# Patient Record
Sex: FEMALE | Race: WHITE | Employment: UNEMPLOYED | URBAN - METROPOLITAN AREA
[De-identification: names, ages, dates, MRNs, and addresses within clinical notes are randomized per-mention and may not be internally consistent; named-entity substitution may affect disease eponyms.]

---

## 2021-03-19 ENCOUNTER — IMMUNIZATION (OUTPATIENT)
Dept: PRIMARY CARE CLINIC | Age: 41
End: 2021-03-19
Payer: COMMERCIAL

## 2021-03-19 PROCEDURE — 91300 COVID-19, PFIZER VACCINE 30MCG/0.3ML DOSE: CPT | Performed by: FAMILY MEDICINE

## 2021-03-19 PROCEDURE — 0001A COVID-19, PFIZER VACCINE 30MCG/0.3ML DOSE: CPT | Performed by: FAMILY MEDICINE

## 2021-04-09 ENCOUNTER — IMMUNIZATION (OUTPATIENT)
Dept: PRIMARY CARE CLINIC | Age: 41
End: 2021-04-09
Payer: COMMERCIAL

## 2021-04-09 PROCEDURE — 0002A COVID-19, PFIZER VACCINE 30MCG/0.3ML DOSE: CPT | Performed by: FAMILY MEDICINE

## 2021-04-09 PROCEDURE — 91300 COVID-19, PFIZER VACCINE 30MCG/0.3ML DOSE: CPT | Performed by: FAMILY MEDICINE

## 2021-05-08 ENCOUNTER — APPOINTMENT (OUTPATIENT)
Dept: CT IMAGING | Age: 41
End: 2021-05-08
Payer: COMMERCIAL

## 2021-05-08 ENCOUNTER — HOSPITAL ENCOUNTER (EMERGENCY)
Age: 41
Discharge: HOME | End: 2021-05-08
Payer: COMMERCIAL

## 2021-05-08 VITALS
BODY MASS INDEX: 34.13 KG/M2 | RESPIRATION RATE: 14 BRPM | SYSTOLIC BLOOD PRESSURE: 105 MMHG | TEMPERATURE: 98.1 F | OXYGEN SATURATION: 100 % | WEIGHT: 252 LBS | HEART RATE: 64 BPM | DIASTOLIC BLOOD PRESSURE: 64 MMHG | HEIGHT: 72 IN

## 2021-05-08 DIAGNOSIS — M43.00 SPONDYLOLYSIS: ICD-10-CM

## 2021-05-08 DIAGNOSIS — G89.29 ACUTE EXACERBATION OF CHRONIC LOW BACK PAIN: Primary | ICD-10-CM

## 2021-05-08 DIAGNOSIS — M54.50 ACUTE EXACERBATION OF CHRONIC LOW BACK PAIN: Primary | ICD-10-CM

## 2021-05-08 LAB
A/G RATIO: 1.8 (ref 1.1–2.2)
ALBUMIN SERPL-MCNC: 4.5 G/DL (ref 3.4–5)
ALP BLD-CCNC: 56 U/L (ref 40–129)
ALT SERPL-CCNC: 15 U/L (ref 10–40)
ANION GAP SERPL CALCULATED.3IONS-SCNC: 8 MMOL/L (ref 3–16)
AST SERPL-CCNC: 13 U/L (ref 15–37)
BASOPHILS ABSOLUTE: 0 K/UL (ref 0–0.2)
BASOPHILS RELATIVE PERCENT: 0.5 %
BILIRUB SERPL-MCNC: 0.5 MG/DL (ref 0–1)
BILIRUBIN URINE: NEGATIVE
BLOOD, URINE: NEGATIVE
BUN BLDV-MCNC: 8 MG/DL (ref 7–20)
CALCIUM SERPL-MCNC: 9.9 MG/DL (ref 8.3–10.6)
CHLORIDE BLD-SCNC: 103 MMOL/L (ref 99–110)
CLARITY: CLEAR
CO2: 27 MMOL/L (ref 21–32)
COLOR: YELLOW
CREAT SERPL-MCNC: 0.7 MG/DL (ref 0.6–1.1)
EOSINOPHILS ABSOLUTE: 0 K/UL (ref 0–0.6)
EOSINOPHILS RELATIVE PERCENT: 0.9 %
GFR AFRICAN AMERICAN: >60
GFR NON-AFRICAN AMERICAN: >60
GLOBULIN: 2.5 G/DL
GLUCOSE BLD-MCNC: 88 MG/DL (ref 70–99)
GLUCOSE URINE: NEGATIVE MG/DL
HCT VFR BLD CALC: 39.6 % (ref 36–48)
HEMOGLOBIN: 13.3 G/DL (ref 12–16)
KETONES, URINE: NEGATIVE MG/DL
LEUKOCYTE ESTERASE, URINE: NEGATIVE
LYMPHOCYTES ABSOLUTE: 1.5 K/UL (ref 1–5.1)
LYMPHOCYTES RELATIVE PERCENT: 33.9 %
MCH RBC QN AUTO: 29.8 PG (ref 26–34)
MCHC RBC AUTO-ENTMCNC: 33.7 G/DL (ref 31–36)
MCV RBC AUTO: 88.5 FL (ref 80–100)
MICROSCOPIC EXAMINATION: NORMAL
MONOCYTES ABSOLUTE: 0.4 K/UL (ref 0–1.3)
MONOCYTES RELATIVE PERCENT: 9.1 %
NEUTROPHILS ABSOLUTE: 2.4 K/UL (ref 1.7–7.7)
NEUTROPHILS RELATIVE PERCENT: 55.6 %
NITRITE, URINE: NEGATIVE
PDW BLD-RTO: 13.8 % (ref 12.4–15.4)
PH UA: 6 (ref 5–8)
PLATELET # BLD: 215 K/UL (ref 135–450)
PMV BLD AUTO: 7.7 FL (ref 5–10.5)
POTASSIUM SERPL-SCNC: 4.1 MMOL/L (ref 3.5–5.1)
PROTEIN UA: NEGATIVE MG/DL
RBC # BLD: 4.48 M/UL (ref 4–5.2)
SODIUM BLD-SCNC: 138 MMOL/L (ref 136–145)
SPECIFIC GRAVITY UA: <=1.005 (ref 1–1.03)
TOTAL PROTEIN: 7 G/DL (ref 6.4–8.2)
URINE TYPE: NORMAL
UROBILINOGEN, URINE: 0.2 E.U./DL
WBC # BLD: 4.3 K/UL (ref 4–11)

## 2021-05-08 PROCEDURE — 99285 EMERGENCY DEPT VISIT HI MDM: CPT

## 2021-05-08 PROCEDURE — 6360000002 HC RX W HCPCS: Performed by: NURSE PRACTITIONER

## 2021-05-08 PROCEDURE — 96372 THER/PROPH/DIAG INJ SC/IM: CPT

## 2021-05-08 PROCEDURE — 85025 COMPLETE CBC W/AUTO DIFF WBC: CPT

## 2021-05-08 PROCEDURE — 81003 URINALYSIS AUTO W/O SCOPE: CPT

## 2021-05-08 PROCEDURE — 6370000000 HC RX 637 (ALT 250 FOR IP): Performed by: NURSE PRACTITIONER

## 2021-05-08 PROCEDURE — 80053 COMPREHEN METABOLIC PANEL: CPT

## 2021-05-08 PROCEDURE — 72131 CT LUMBAR SPINE W/O DYE: CPT

## 2021-05-08 RX ORDER — ORPHENADRINE CITRATE 30 MG/ML
60 INJECTION INTRAMUSCULAR; INTRAVENOUS ONCE
Status: COMPLETED | OUTPATIENT
Start: 2021-05-08 | End: 2021-05-08

## 2021-05-08 RX ORDER — CYCLOBENZAPRINE HCL 10 MG
10 TABLET ORAL 3 TIMES DAILY PRN
Qty: 21 TABLET | Refills: 0 | Status: SHIPPED | OUTPATIENT
Start: 2021-05-08 | End: 2021-05-18

## 2021-05-08 RX ORDER — PREDNISONE 20 MG/1
60 TABLET ORAL ONCE
Status: COMPLETED | OUTPATIENT
Start: 2021-05-08 | End: 2021-05-08

## 2021-05-08 RX ORDER — IBUPROFEN 600 MG/1
600 TABLET ORAL 3 TIMES DAILY PRN
Qty: 30 TABLET | Refills: 0 | Status: SHIPPED | OUTPATIENT
Start: 2021-05-08 | End: 2021-12-19

## 2021-05-08 RX ORDER — PREDNISONE 10 MG/1
60 TABLET ORAL DAILY
Qty: 30 TABLET | Refills: 0 | Status: SHIPPED | OUTPATIENT
Start: 2021-05-08 | End: 2021-05-13

## 2021-05-08 RX ORDER — SULFAMETHOXAZOLE AND TRIMETHOPRIM 800; 160 MG/1; MG/1
TABLET ORAL
COMMUNITY
Start: 2021-04-16 | End: 2021-12-19

## 2021-05-08 RX ORDER — KETOROLAC TROMETHAMINE 30 MG/ML
30 INJECTION, SOLUTION INTRAMUSCULAR; INTRAVENOUS ONCE
Status: COMPLETED | OUTPATIENT
Start: 2021-05-08 | End: 2021-05-08

## 2021-05-08 RX ADMIN — KETOROLAC TROMETHAMINE 30 MG: 30 INJECTION, SOLUTION INTRAMUSCULAR; INTRAVENOUS at 13:35

## 2021-05-08 RX ADMIN — PREDNISONE 60 MG: 20 TABLET ORAL at 13:34

## 2021-05-08 RX ADMIN — ORPHENADRINE CITRATE 60 MG: 30 INJECTION INTRAMUSCULAR; INTRAVENOUS at 15:12

## 2021-05-08 ASSESSMENT — PAIN DESCRIPTION - ORIENTATION: ORIENTATION: LOWER

## 2021-05-08 ASSESSMENT — PAIN SCALES - GENERAL: PAINLEVEL_OUTOF10: 7

## 2021-05-08 ASSESSMENT — PAIN DESCRIPTION - LOCATION: LOCATION: BACK

## 2021-05-08 ASSESSMENT — PAIN DESCRIPTION - PAIN TYPE
TYPE: ACUTE PAIN
TYPE: ACUTE PAIN
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
## 2021-12-19 ENCOUNTER — HOSPITAL ENCOUNTER (EMERGENCY)
Age: 41
Discharge: HOME | DRG: 177 | End: 2021-12-19
Attending: EMERGENCY MEDICINE
Payer: COMMERCIAL

## 2021-12-19 ENCOUNTER — APPOINTMENT (OUTPATIENT)
Dept: GENERAL RADIOLOGY | Age: 41
DRG: 177 | End: 2021-12-19
Payer: COMMERCIAL

## 2021-12-19 VITALS
TEMPERATURE: 99 F | HEART RATE: 72 BPM | DIASTOLIC BLOOD PRESSURE: 72 MMHG | BODY MASS INDEX: 35.21 KG/M2 | HEIGHT: 72 IN | WEIGHT: 260 LBS | RESPIRATION RATE: 18 BRPM | OXYGEN SATURATION: 94 % | SYSTOLIC BLOOD PRESSURE: 128 MMHG

## 2021-12-19 DIAGNOSIS — E87.6 HYPOKALEMIA: ICD-10-CM

## 2021-12-19 DIAGNOSIS — U07.1 COVID: Primary | ICD-10-CM

## 2021-12-19 LAB
A/G RATIO: 1.3 (ref 1.1–2.2)
ALBUMIN SERPL-MCNC: 3.9 G/DL (ref 3.4–5)
ALP BLD-CCNC: 51 U/L (ref 40–129)
ALT SERPL-CCNC: 16 U/L (ref 10–40)
ANION GAP SERPL CALCULATED.3IONS-SCNC: 14 MMOL/L (ref 3–16)
AST SERPL-CCNC: 28 U/L (ref 15–37)
BASOPHILS ABSOLUTE: 0 K/UL (ref 0–0.2)
BASOPHILS RELATIVE PERCENT: 0.1 %
BILIRUB SERPL-MCNC: 0.4 MG/DL (ref 0–1)
BUN BLDV-MCNC: 7 MG/DL (ref 7–20)
CALCIUM SERPL-MCNC: 8.5 MG/DL (ref 8.3–10.6)
CHLORIDE BLD-SCNC: 99 MMOL/L (ref 99–110)
CO2: 22 MMOL/L (ref 21–32)
CREAT SERPL-MCNC: 0.6 MG/DL (ref 0.6–1.1)
EOSINOPHILS ABSOLUTE: 0 K/UL (ref 0–0.6)
EOSINOPHILS RELATIVE PERCENT: 0 %
GFR AFRICAN AMERICAN: >60
GFR NON-AFRICAN AMERICAN: >60
GLUCOSE BLD-MCNC: 105 MG/DL (ref 70–99)
HCT VFR BLD CALC: 35.9 % (ref 36–48)
HEMOGLOBIN: 12.4 G/DL (ref 12–16)
LYMPHOCYTES ABSOLUTE: 0.6 K/UL (ref 1–5.1)
LYMPHOCYTES RELATIVE PERCENT: 18.8 %
MAGNESIUM: 1.9 MG/DL (ref 1.8–2.4)
MCH RBC QN AUTO: 29.1 PG (ref 26–34)
MCHC RBC AUTO-ENTMCNC: 34.5 G/DL (ref 31–36)
MCV RBC AUTO: 84.4 FL (ref 80–100)
MONOCYTES ABSOLUTE: 0.1 K/UL (ref 0–1.3)
MONOCYTES RELATIVE PERCENT: 4 %
NEUTROPHILS ABSOLUTE: 2.5 K/UL (ref 1.7–7.7)
NEUTROPHILS RELATIVE PERCENT: 77.1 %
PDW BLD-RTO: 13.5 % (ref 12.4–15.4)
PLATELET # BLD: 141 K/UL (ref 135–450)
PMV BLD AUTO: 8.6 FL (ref 5–10.5)
POTASSIUM REFLEX MAGNESIUM: 3.4 MMOL/L (ref 3.5–5.1)
RBC # BLD: 4.25 M/UL (ref 4–5.2)
SODIUM BLD-SCNC: 135 MMOL/L (ref 136–145)
TOTAL PROTEIN: 6.8 G/DL (ref 6.4–8.2)
TROPONIN: <0.01 NG/ML
WBC # BLD: 3.3 K/UL (ref 4–11)

## 2021-12-19 PROCEDURE — 93005 ELECTROCARDIOGRAM TRACING: CPT | Performed by: EMERGENCY MEDICINE

## 2021-12-19 PROCEDURE — 96374 THER/PROPH/DIAG INJ IV PUSH: CPT

## 2021-12-19 PROCEDURE — 80053 COMPREHEN METABOLIC PANEL: CPT

## 2021-12-19 PROCEDURE — 71045 X-RAY EXAM CHEST 1 VIEW: CPT

## 2021-12-19 PROCEDURE — 99284 EMERGENCY DEPT VISIT MOD MDM: CPT

## 2021-12-19 PROCEDURE — 85025 COMPLETE CBC W/AUTO DIFF WBC: CPT

## 2021-12-19 PROCEDURE — 84484 ASSAY OF TROPONIN QUANT: CPT

## 2021-12-19 PROCEDURE — 6370000000 HC RX 637 (ALT 250 FOR IP): Performed by: EMERGENCY MEDICINE

## 2021-12-19 PROCEDURE — 83735 ASSAY OF MAGNESIUM: CPT

## 2021-12-19 PROCEDURE — 6360000002 HC RX W HCPCS: Performed by: EMERGENCY MEDICINE

## 2021-12-19 RX ORDER — POTASSIUM CHLORIDE 20 MEQ/1
40 TABLET, EXTENDED RELEASE ORAL ONCE
Status: COMPLETED | OUTPATIENT
Start: 2021-12-19 | End: 2021-12-19

## 2021-12-19 RX ORDER — KETOROLAC TROMETHAMINE 30 MG/ML
15 INJECTION, SOLUTION INTRAMUSCULAR; INTRAVENOUS ONCE
Status: COMPLETED | OUTPATIENT
Start: 2021-12-19 | End: 2021-12-19

## 2021-12-19 RX ORDER — ONDANSETRON 4 MG/1
4 TABLET, ORALLY DISINTEGRATING ORAL ONCE
Status: COMPLETED | OUTPATIENT
Start: 2021-12-19 | End: 2021-12-19

## 2021-12-19 RX ORDER — PSEUDOEPHEDRINE HYDROCHLORIDE 30 MG/1
30 TABLET ORAL EVERY 6 HOURS PRN
Qty: 20 TABLET | Refills: 1 | Status: SHIPPED | OUTPATIENT
Start: 2021-12-19 | End: 2022-12-19

## 2021-12-19 RX ORDER — ONDANSETRON 4 MG/1
4 TABLET, FILM COATED ORAL 3 TIMES DAILY PRN
Qty: 15 TABLET | Refills: 0 | Status: SHIPPED | OUTPATIENT
Start: 2021-12-19

## 2021-12-19 RX ADMIN — KETOROLAC TROMETHAMINE 15 MG: 30 INJECTION, SOLUTION INTRAMUSCULAR at 14:29

## 2021-12-19 RX ADMIN — ONDANSETRON 4 MG: 4 TABLET, ORALLY DISINTEGRATING ORAL at 14:29

## 2021-12-19 RX ADMIN — POTASSIUM CHLORIDE 40 MEQ: 1500 TABLET, EXTENDED RELEASE ORAL at 16:00

## 2021-12-19 ASSESSMENT — ENCOUNTER SYMPTOMS
DIARRHEA: 0
WHEEZING: 0
PHOTOPHOBIA: 0
NAUSEA: 0
ABDOMINAL DISTENTION: 0
VOMITING: 0
RHINORRHEA: 0
COUGH: 1
CHEST TIGHTNESS: 1
SHORTNESS OF BREATH: 1
BACK PAIN: 0

## 2021-12-19 ASSESSMENT — PAIN SCALES - GENERAL: PAINLEVEL_OUTOF10: 5

## 2021-12-19 NOTE — ED PROVIDER NOTES
Emergency Department Provider Note  Location: [facility]  ED  2021     Patient Identification  Aneudy Springer is a 39 y.o. female    Chief Complaint  Positive For Covid-19 (patient tested positive for COVID on Monday after sympotms started. pt is vaccinated. she reports she's had a steady decline through out the week. had a video conference with her PCP and was sent to the ED. pt oxygen saturation decreased to 88%-93% on RA upon ambulation with a heart rate ranging from 138-152 bpm )          HPI  (History provided by patient)  Patient is a 45-year-old female generally healthy who presents for Covid symptoms. Patient reports that she was diagnosed with Covid on Monday developed symptoms first on Monday. She has been taking Tylenol and ibuprofen and Mucinex at home however \"my symptoms just keep getting worse\". She reports generalized myalgias generalized headache cough chest tightness fatigue. She had video appointment with her primary doctor this morning and reported that her oxygen levels went as low as 88-89 when she was sleeping last night which was noted by her domestic partner. PCP referred her to emergency department. Patient denies any pleurisy no severe chest pain no loss of consciousness. She does endorse nausea and 1 or 2 episodes nonbloody nonbilious emesis. No abdominal pain no back pain no urinary symptoms. I have reviewed the following nursing documentation:  Allergies: Allergies   Allergen Reactions    Cephalosporins      rash       Past medical history:  has a past medical history of Uterus didelphys. Past surgical history:  has a past surgical history that includes laminectomy () and  section (). Home medications:   Prior to Admission medications    Not on File       Social history:  reports that she has quit smoking. She has never used smokeless tobacco. She reports current alcohol use. She reports that she does not use drugs.     Family history:    Family History   Problem Relation Age of Onset    High Blood Pressure Mother     Cancer Father     Cancer Sister          ROS  Review of Systems   Constitutional: Positive for activity change, appetite change, chills and fatigue. Negative for fever. HENT: Negative for congestion and rhinorrhea. Eyes: Negative for photophobia and visual disturbance. Respiratory: Positive for cough, chest tightness and shortness of breath. Negative for wheezing. Cardiovascular: Negative for chest pain and palpitations. Gastrointestinal: Negative for abdominal distention, diarrhea, nausea and vomiting. Genitourinary: Negative for dysuria and hematuria. Musculoskeletal: Positive for myalgias. Negative for back pain and neck pain. Skin: Negative for rash and wound. Neurological: Positive for headaches. Negative for syncope and weakness. Psychiatric/Behavioral: Negative for agitation and confusion. Exam  ED Triage Vitals   BP Temp Temp src Pulse Resp SpO2 Height Weight   -- -- -- -- -- -- -- --       Physical Exam  Vitals and nursing note reviewed. Constitutional:       General: She is not in acute distress. Appearance: She is well-developed. HENT:      Head: Normocephalic and atraumatic. Nose: Nose normal. No congestion. Eyes:      General: No scleral icterus. Extraocular Movements: Extraocular movements intact. Pupils: Pupils are equal, round, and reactive to light. Cardiovascular:      Rate and Rhythm: Normal rate and regular rhythm. Heart sounds: No murmur heard. Pulmonary:      Effort: Pulmonary effort is normal.      Breath sounds: Normal breath sounds. No wheezing, rhonchi or rales. Abdominal:      General: There is no distension. Palpations: Abdomen is soft. Tenderness: There is no abdominal tenderness. There is no guarding or rebound. Musculoskeletal:         General: No deformity. Normal range of motion.       Cervical back: Normal range of motion and neck supple. No rigidity or tenderness. Skin:     General: Skin is warm. Findings: No rash. Neurological:      Mental Status: She is alert and oriented to person, place, and time. Motor: No abnormal muscle tone. Coordination: Coordination normal.   Psychiatric:         Mood and Affect: Mood normal.         Behavior: Behavior normal.           ED Course    ED Medication Orders (From admission, onward)    Start Ordered     Status Ordering Provider    12/19/21 1430 12/19/21 1422  ketorolac (TORADOL) injection 15 mg  ONCE         Last MAR action: Given - by Edward Duran on 12/19/21 at [facility]    12/19/21 1430 12/19/21 1422  ondansetron (ZOFRAN-ODT) disintegrating tablet 4 mg  ONCE         Last MAR action: Given - by Edward Duran on 12/19/21 at [facility]          EKG  Normal sinus rhythm rate 80 normal axis normal intervals no evidence of conduction abnormalities no diagnostic ischemic changes noted,       Radiology  XR CHEST PORTABLE    Result Date: 12/19/2021  EXAMINATION: ONE XRAY VIEW OF THE CHEST 12/19/2021 1:36 pm COMPARISON: 04/14/2008 HISTORY: ORDERING SYSTEM PROVIDED HISTORY: COVID + TECHNOLOGIST PROVIDED HISTORY: Reason for exam:->COVID + Reason for Exam: COVID+ FINDINGS: The heart and pulmonary vascularity are within normal limits. There is multifocal patchy bilateral airspace disease. There are no pleural effusions.      Patchy multifocal infiltrates most consistent with pneumonia, likely COVID in etiology         Labs  Results for orders placed or performed during the hospital encounter of 12/19/21   EKG 12 Lead   Result Value Ref Range    Ventricular Rate 78 BPM    Atrial Rate 78 BPM    P-R Interval 130 ms    QRS Duration 100 ms    Q-T Interval 372 ms    QTc Calculation (Bazett) 424 ms    P Axis 37 degrees    R Axis 45 degrees    T Axis 18 degrees    Diagnosis       Normal sinus rhythmNormal ECGNo previous ECGs available MDM  Patient seen and evaluated. Relevant records reviewed. 77-year-old female presents with multiple symptoms in the setting of diagnosed COVID-19 infection. On exam she is very well-appearing no acute distress with normal vitals. Oxygen saturation 97% with good Plath on the monitor. Speaking in full sentences no respiratory distress clear breath sounds. Chest x-ray consistent with Covid pneumonia. Patient did desaturate to 88 or 89% with ambulation here in the ED but immediately climbs back into mid upper 90s. Will obtain basic labs and screening measure but if they are reassuring at this point I feel that we would be doing her disservice by admitting her and I discussed this with her that the risk outweighs the benefit at this point. We discussed monitoring closely for any worsening symptoms or persistent hypoxia at home as well as supportive care options. We will give her Zofran for the nausea and encourage p.o. hydration. Patient agreeable to plan expressed understanding plan. Clinical Impression:  1. COVID          Disposition:  Discharge to home in good condition. Blood pressure 117/69, pulse 78, temperature 99 °F (37.2 °C), temperature source Oral, resp. rate 13, height 6' (1.829 m), weight 260 lb (117.9 kg), last menstrual period 03/29/2016, SpO2 96 %, unknown if currently breastfeeding. Patient was given scripts for the following medications. I counseled patient how to take these medications. New Prescriptions    No medications on file       Disposition referral (if applicable):  No follow-up provider specified. Total critical care time is 0 minutes, which excludes separately billable procedures and updating family. Time spent is specifically for management of the presenting complaint and symptoms initially, direct bedside care, reevaluation, review of records, and consultation.   There was a high probability of clinically significant life-threatening deterioration in the patient's condition, which required my urgent intervention. This chart was generated in part by using Dragon Dictation system and may contain errors related to that system including errors in grammar, punctuation, and spelling, as well as words and phrases that may be inappropriate. If there are any questions or concerns please feel free to contact the dictating provider for clarification.      Kami Caldera MD  [address] Jacobson MD  12/19/21 6824

## 2021-12-19 NOTE — ED TRIAGE NOTES
Patient ambulated from the lobby to room 22 approximately  feet.  Her oxygen level remained between 88-93% on room air with a heart rate between 138-152 bpm

## 2021-12-20 ENCOUNTER — TELEPHONE (OUTPATIENT)
Dept: FAMILY MEDICINE CLINIC | Age: 41
End: 2021-12-20

## 2021-12-20 ENCOUNTER — NURSE TRIAGE (OUTPATIENT)
Dept: OTHER | Facility: CLINIC | Age: 41
End: 2021-12-20

## 2021-12-20 LAB
EKG ATRIAL RATE: 78 BPM
EKG DIAGNOSIS: NORMAL
EKG P AXIS: 37 DEGREES
EKG P-R INTERVAL: 130 MS
EKG Q-T INTERVAL: 372 MS
EKG QRS DURATION: 100 MS
EKG QTC CALCULATION (BAZETT): 424 MS
EKG R AXIS: 45 DEGREES
EKG T AXIS: 18 DEGREES
EKG VENTRICULAR RATE: 78 BPM

## 2021-12-20 PROCEDURE — 93010 ELECTROCARDIOGRAM REPORT: CPT | Performed by: INTERNAL MEDICINE

## 2021-12-20 NOTE — TELEPHONE ENCOUNTER
Documenting-- Patient has no PCP. Pt's  called stating that patient was release from [facility] ED 12/20/21 with being Covid Positive. Pt's  states Patient was sent home with no medication & was told to return to ED if Oxygen is below 88. Patient was give [employer]  phone number so they could try to get New Patient appt established with being Covid Positive.

## 2021-12-20 NOTE — TELEPHONE ENCOUNTER
Received call from Cookie at [facility] with Red Flag Complaint. Subjective: Caller states \"Patient not on phone after transfer - attempted to call back x3 with no success\"     Current Symptoms: Unknown    Onset: n/a; n/a    Associated Symptoms: NA    Pain Severity: No triage performed/10; N/A; none    Temperature: None n/a    What has been tried: Unknown    LMP: NA Pregnant: Unknown    Recommended disposition: No disposition recommended as no triage performed    Called patient back 3 times with no success     Attention Provider: Thank you for allowing me to participate in the care of your patient. The patient was connected to triage in response to information provided to the ECC/PSC. Please do not respond through this encounter as the response is not directed to a shared pool.

## 2021-12-21 ENCOUNTER — APPOINTMENT (OUTPATIENT)
Dept: CT IMAGING | Age: 41
DRG: 177 | End: 2021-12-21
Payer: COMMERCIAL

## 2021-12-21 ENCOUNTER — HOSPITAL ENCOUNTER (INPATIENT)
Age: 41
LOS: 5 days | Discharge: HOME | DRG: 177 | End: 2021-12-26
Attending: EMERGENCY MEDICINE | Admitting: INTERNAL MEDICINE
Payer: COMMERCIAL

## 2021-12-21 ENCOUNTER — APPOINTMENT (OUTPATIENT)
Dept: GENERAL RADIOLOGY | Age: 41
DRG: 177 | End: 2021-12-21
Payer: COMMERCIAL

## 2021-12-21 DIAGNOSIS — U07.1 COVID-19 VIRUS INFECTION: ICD-10-CM

## 2021-12-21 DIAGNOSIS — J96.01 ACUTE RESPIRATORY FAILURE WITH HYPOXIA (HCC): Primary | ICD-10-CM

## 2021-12-21 PROBLEM — J96.00 ACUTE RESPIRATORY FAILURE DUE TO COVID-19 (HCC): Status: ACTIVE | Noted: 2021-12-21

## 2021-12-21 PROBLEM — J96.00 ACUTE RESPIRATORY FAILURE (HCC): Status: ACTIVE | Noted: 2021-12-21

## 2021-12-21 LAB
A/G RATIO: 1.1 (ref 1.1–2.2)
ALBUMIN SERPL-MCNC: 3.7 G/DL (ref 3.4–5)
ALP BLD-CCNC: 59 U/L (ref 40–129)
ALT SERPL-CCNC: 17 U/L (ref 10–40)
ANION GAP SERPL CALCULATED.3IONS-SCNC: 14 MMOL/L (ref 3–16)
AST SERPL-CCNC: 34 U/L (ref 15–37)
BASOPHILS ABSOLUTE: 0 K/UL (ref 0–0.2)
BASOPHILS RELATIVE PERCENT: 0.1 %
BILIRUB SERPL-MCNC: 0.5 MG/DL (ref 0–1)
BUN BLDV-MCNC: 7 MG/DL (ref 7–20)
C-REACTIVE PROTEIN: 159.9 MG/L (ref 0–5.1)
CALCIUM SERPL-MCNC: 8.8 MG/DL (ref 8.3–10.6)
CHLORIDE BLD-SCNC: 98 MMOL/L (ref 99–110)
CO2: 24 MMOL/L (ref 21–32)
CREAT SERPL-MCNC: 0.6 MG/DL (ref 0.6–1.1)
D DIMER: 451 NG/ML DDU (ref 0–229)
EKG ATRIAL RATE: 83 BPM
EKG DIAGNOSIS: NORMAL
EKG P AXIS: 15 DEGREES
EKG P-R INTERVAL: 130 MS
EKG Q-T INTERVAL: 368 MS
EKG QRS DURATION: 98 MS
EKG QTC CALCULATION (BAZETT): 432 MS
EKG R AXIS: 39 DEGREES
EKG T AXIS: 19 DEGREES
EKG VENTRICULAR RATE: 83 BPM
EOSINOPHILS ABSOLUTE: 0 K/UL (ref 0–0.6)
EOSINOPHILS RELATIVE PERCENT: 0 %
FERRITIN: 1004 NG/ML (ref 15–150)
GFR AFRICAN AMERICAN: >60
GFR NON-AFRICAN AMERICAN: >60
GLUCOSE BLD-MCNC: 106 MG/DL (ref 70–99)
HCT VFR BLD CALC: 36.3 % (ref 36–48)
HEMOGLOBIN: 12.7 G/DL (ref 12–16)
INR BLD: 1.13 (ref 0.88–1.12)
LACTATE DEHYDROGENASE: 527 U/L (ref 100–190)
LACTIC ACID, SEPSIS: 1 MMOL/L (ref 0.4–1.9)
LYMPHOCYTES ABSOLUTE: 0.7 K/UL (ref 1–5.1)
LYMPHOCYTES RELATIVE PERCENT: 16.3 %
MCH RBC QN AUTO: 29.3 PG (ref 26–34)
MCHC RBC AUTO-ENTMCNC: 34.8 G/DL (ref 31–36)
MCV RBC AUTO: 84 FL (ref 80–100)
MONOCYTES ABSOLUTE: 0.2 K/UL (ref 0–1.3)
MONOCYTES RELATIVE PERCENT: 4.3 %
NEUTROPHILS ABSOLUTE: 3.6 K/UL (ref 1.7–7.7)
NEUTROPHILS RELATIVE PERCENT: 79.3 %
PDW BLD-RTO: 13.6 % (ref 12.4–15.4)
PLATELET # BLD: 193 K/UL (ref 135–450)
PMV BLD AUTO: 8.1 FL (ref 5–10.5)
POTASSIUM REFLEX MAGNESIUM: 3.8 MMOL/L (ref 3.5–5.1)
PRO-BNP: 178 PG/ML (ref 0–124)
PROCALCITONIN: 0.13 NG/ML (ref 0–0.15)
PROTHROMBIN TIME: 12.8 SEC (ref 9.9–12.7)
RBC # BLD: 4.32 M/UL (ref 4–5.2)
SODIUM BLD-SCNC: 136 MMOL/L (ref 136–145)
SPECIMEN STATUS: NORMAL
TOTAL PROTEIN: 7.1 G/DL (ref 6.4–8.2)
TROPONIN: <0.01 NG/ML
WBC # BLD: 4.5 K/UL (ref 4–11)

## 2021-12-21 PROCEDURE — 83615 LACTATE (LD) (LDH) ENZYME: CPT

## 2021-12-21 PROCEDURE — 85610 PROTHROMBIN TIME: CPT

## 2021-12-21 PROCEDURE — 96375 TX/PRO/DX INJ NEW DRUG ADDON: CPT

## 2021-12-21 PROCEDURE — 2580000003 HC RX 258: Performed by: INTERNAL MEDICINE

## 2021-12-21 PROCEDURE — 83605 ASSAY OF LACTIC ACID: CPT

## 2021-12-21 PROCEDURE — 71045 X-RAY EXAM CHEST 1 VIEW: CPT

## 2021-12-21 PROCEDURE — 93010 ELECTROCARDIOGRAM REPORT: CPT | Performed by: INTERNAL MEDICINE

## 2021-12-21 PROCEDURE — 87040 BLOOD CULTURE FOR BACTERIA: CPT

## 2021-12-21 PROCEDURE — 71260 CT THORAX DX C+: CPT

## 2021-12-21 PROCEDURE — 6360000004 HC RX CONTRAST MEDICATION: Performed by: EMERGENCY MEDICINE

## 2021-12-21 PROCEDURE — 84145 PROCALCITONIN (PCT): CPT

## 2021-12-21 PROCEDURE — 99285 EMERGENCY DEPT VISIT HI MDM: CPT

## 2021-12-21 PROCEDURE — 82728 ASSAY OF FERRITIN: CPT

## 2021-12-21 PROCEDURE — 2580000003 HC RX 258: Performed by: EMERGENCY MEDICINE

## 2021-12-21 PROCEDURE — 6360000002 HC RX W HCPCS

## 2021-12-21 PROCEDURE — 83880 ASSAY OF NATRIURETIC PEPTIDE: CPT

## 2021-12-21 PROCEDURE — 86140 C-REACTIVE PROTEIN: CPT

## 2021-12-21 PROCEDURE — 94640 AIRWAY INHALATION TREATMENT: CPT

## 2021-12-21 PROCEDURE — 6370000000 HC RX 637 (ALT 250 FOR IP): Performed by: INTERNAL MEDICINE

## 2021-12-21 PROCEDURE — 6360000002 HC RX W HCPCS: Performed by: INTERNAL MEDICINE

## 2021-12-21 PROCEDURE — 80053 COMPREHEN METABOLIC PANEL: CPT

## 2021-12-21 PROCEDURE — 87076 CULTURE ANAEROBE IDENT EACH: CPT

## 2021-12-21 PROCEDURE — 85025 COMPLETE CBC W/AUTO DIFF WBC: CPT

## 2021-12-21 PROCEDURE — 84484 ASSAY OF TROPONIN QUANT: CPT

## 2021-12-21 PROCEDURE — 87077 CULTURE AEROBIC IDENTIFY: CPT

## 2021-12-21 PROCEDURE — 2700000000 HC OXYGEN THERAPY PER DAY

## 2021-12-21 PROCEDURE — 93005 ELECTROCARDIOGRAM TRACING: CPT | Performed by: EMERGENCY MEDICINE

## 2021-12-21 PROCEDURE — 85379 FIBRIN DEGRADATION QUANT: CPT

## 2021-12-21 PROCEDURE — 94761 N-INVAS EAR/PLS OXIMETRY MLT: CPT

## 2021-12-21 PROCEDURE — 1200000000 HC SEMI PRIVATE

## 2021-12-21 PROCEDURE — 96374 THER/PROPH/DIAG INJ IV PUSH: CPT

## 2021-12-21 PROCEDURE — 6360000002 HC RX W HCPCS: Performed by: EMERGENCY MEDICINE

## 2021-12-21 RX ORDER — DEXAMETHASONE SODIUM PHOSPHATE 10 MG/ML
10 INJECTION INTRAMUSCULAR; INTRAVENOUS ONCE
Status: COMPLETED | OUTPATIENT
Start: 2021-12-21 | End: 2021-12-21

## 2021-12-21 RX ORDER — ZINC SULFATE 50(220)MG
50 CAPSULE ORAL DAILY
Status: DISCONTINUED | OUTPATIENT
Start: 2021-12-21 | End: 2021-12-26 | Stop reason: HOSPADM

## 2021-12-21 RX ORDER — MECOBALAMIN 5000 MCG
5 TABLET,DISINTEGRATING ORAL NIGHTLY
Status: DISCONTINUED | OUTPATIENT
Start: 2021-12-21 | End: 2021-12-26 | Stop reason: HOSPADM

## 2021-12-21 RX ORDER — GUAIFENESIN/DEXTROMETHORPHAN 100-10MG/5
5 SYRUP ORAL EVERY 4 HOURS PRN
Status: DISCONTINUED | OUTPATIENT
Start: 2021-12-21 | End: 2021-12-26 | Stop reason: HOSPADM

## 2021-12-21 RX ORDER — SODIUM CHLORIDE 9 MG/ML
25 INJECTION, SOLUTION INTRAVENOUS PRN
Status: DISCONTINUED | OUTPATIENT
Start: 2021-12-21 | End: 2021-12-26 | Stop reason: HOSPADM

## 2021-12-21 RX ORDER — ACETAMINOPHEN 650 MG/1
650 SUPPOSITORY RECTAL EVERY 6 HOURS PRN
Status: DISCONTINUED | OUTPATIENT
Start: 2021-12-21 | End: 2021-12-26 | Stop reason: HOSPADM

## 2021-12-21 RX ORDER — SODIUM CHLORIDE 0.9 % (FLUSH) 0.9 %
5-40 SYRINGE (ML) INJECTION PRN
Status: DISCONTINUED | OUTPATIENT
Start: 2021-12-21 | End: 2021-12-21 | Stop reason: SDUPTHER

## 2021-12-21 RX ORDER — ONDANSETRON 4 MG/1
4 TABLET, ORALLY DISINTEGRATING ORAL EVERY 8 HOURS PRN
Status: DISCONTINUED | OUTPATIENT
Start: 2021-12-21 | End: 2021-12-26 | Stop reason: HOSPADM

## 2021-12-21 RX ORDER — ASCORBIC ACID 500 MG
500 TABLET ORAL EVERY EVENING
Status: DISCONTINUED | OUTPATIENT
Start: 2021-12-21 | End: 2021-12-26 | Stop reason: HOSPADM

## 2021-12-21 RX ORDER — ONDANSETRON 2 MG/ML
4 INJECTION INTRAMUSCULAR; INTRAVENOUS ONCE
Status: COMPLETED | OUTPATIENT
Start: 2021-12-21 | End: 2021-12-21

## 2021-12-21 RX ORDER — ONDANSETRON 2 MG/ML
4 INJECTION INTRAMUSCULAR; INTRAVENOUS EVERY 6 HOURS PRN
Status: DISCONTINUED | OUTPATIENT
Start: 2021-12-21 | End: 2021-12-26 | Stop reason: HOSPADM

## 2021-12-21 RX ORDER — SODIUM CHLORIDE 0.9 % (FLUSH) 0.9 %
5-40 SYRINGE (ML) INJECTION PRN
Status: DISCONTINUED | OUTPATIENT
Start: 2021-12-21 | End: 2021-12-26 | Stop reason: HOSPADM

## 2021-12-21 RX ORDER — SODIUM CHLORIDE 0.9 % (FLUSH) 0.9 %
5-40 SYRINGE (ML) INJECTION EVERY 12 HOURS SCHEDULED
Status: DISCONTINUED | OUTPATIENT
Start: 2021-12-21 | End: 2021-12-26 | Stop reason: HOSPADM

## 2021-12-21 RX ORDER — SODIUM CHLORIDE 0.9 % (FLUSH) 0.9 %
5-40 SYRINGE (ML) INJECTION EVERY 12 HOURS SCHEDULED
Status: DISCONTINUED | OUTPATIENT
Start: 2021-12-21 | End: 2021-12-21 | Stop reason: SDUPTHER

## 2021-12-21 RX ORDER — ACETAMINOPHEN 325 MG/1
650 TABLET ORAL EVERY 6 HOURS PRN
Status: DISCONTINUED | OUTPATIENT
Start: 2021-12-21 | End: 2021-12-26 | Stop reason: HOSPADM

## 2021-12-21 RX ORDER — BUDESONIDE AND FORMOTEROL FUMARATE DIHYDRATE 160; 4.5 UG/1; UG/1
2 AEROSOL RESPIRATORY (INHALATION) 2 TIMES DAILY
Status: DISCONTINUED | OUTPATIENT
Start: 2021-12-21 | End: 2021-12-26 | Stop reason: HOSPADM

## 2021-12-21 RX ORDER — 0.9 % SODIUM CHLORIDE 0.9 %
30 INTRAVENOUS SOLUTION INTRAVENOUS ONCE
Status: COMPLETED | OUTPATIENT
Start: 2021-12-21 | End: 2021-12-21

## 2021-12-21 RX ORDER — POLYETHYLENE GLYCOL 3350 17 G/17G
17 POWDER, FOR SOLUTION ORAL DAILY PRN
Status: DISCONTINUED | OUTPATIENT
Start: 2021-12-21 | End: 2021-12-26 | Stop reason: HOSPADM

## 2021-12-21 RX ORDER — METHYLPREDNISOLONE SODIUM SUCCINATE 40 MG/ML
40 INJECTION, POWDER, LYOPHILIZED, FOR SOLUTION INTRAMUSCULAR; INTRAVENOUS EVERY 12 HOURS
Status: DISCONTINUED | OUTPATIENT
Start: 2021-12-22 | End: 2021-12-26 | Stop reason: HOSPADM

## 2021-12-21 RX ORDER — ONDANSETRON 2 MG/ML
INJECTION INTRAMUSCULAR; INTRAVENOUS
Status: COMPLETED
Start: 2021-12-21 | End: 2021-12-21

## 2021-12-21 RX ADMIN — ENOXAPARIN SODIUM 30 MG: 100 INJECTION SUBCUTANEOUS at 12:50

## 2021-12-21 RX ADMIN — OXYCODONE HYDROCHLORIDE AND ACETAMINOPHEN 500 MG: 500 TABLET ORAL at 17:52

## 2021-12-21 RX ADMIN — ONDANSETRON 4 MG: 2 INJECTION INTRAMUSCULAR; INTRAVENOUS at 15:00

## 2021-12-21 RX ADMIN — SODIUM CHLORIDE 3537 ML: 9 INJECTION, SOLUTION INTRAVENOUS at 06:01

## 2021-12-21 RX ADMIN — Medication 10 ML: at 12:48

## 2021-12-21 RX ADMIN — DEXAMETHASONE SODIUM PHOSPHATE 10 MG: 10 INJECTION INTRAMUSCULAR; INTRAVENOUS at 06:01

## 2021-12-21 RX ADMIN — Medication 5 MG: at 22:31

## 2021-12-21 RX ADMIN — ONDANSETRON 4 MG: 2 INJECTION INTRAMUSCULAR; INTRAVENOUS at 07:29

## 2021-12-21 RX ADMIN — ZINC SULFATE 220 MG (50 MG) CAPSULE 50 MG: CAPSULE at 22:40

## 2021-12-21 RX ADMIN — SODIUM CHLORIDE, PRESERVATIVE FREE 10 ML: 5 INJECTION INTRAVENOUS at 22:41

## 2021-12-21 RX ADMIN — Medication 2 PUFF: at 20:22

## 2021-12-21 RX ADMIN — ACETAMINOPHEN 650 MG: 325 TABLET ORAL at 22:30

## 2021-12-21 RX ADMIN — IOPAMIDOL 75 ML: 755 INJECTION, SOLUTION INTRAVENOUS at 07:00

## 2021-12-21 RX ADMIN — ONDANSETRON 4 MG: 2 INJECTION INTRAMUSCULAR; INTRAVENOUS at 22:30

## 2021-12-21 RX ADMIN — ENOXAPARIN SODIUM 30 MG: 100 INJECTION SUBCUTANEOUS at 22:30

## 2021-12-21 ASSESSMENT — ENCOUNTER SYMPTOMS: TACHYPNEA: 1

## 2021-12-21 ASSESSMENT — PAIN SCALES - GENERAL
PAINLEVEL_OUTOF10: 3
PAINLEVEL_OUTOF10: 0
PAINLEVEL_OUTOF10: 6
PAINLEVEL_OUTOF10: 0

## 2021-12-21 NOTE — Clinical Note
Patient Class: Inpatient [101]   REQUIRED: Diagnosis: Acute respiratory failure ([address] [518.81. ICD-9-CM]   Estimated Length of Stay: Estimated stay of more than 2 midnights   Telemetry/Cardiac Monitoring Required?: Yes

## 2021-12-21 NOTE — PROGRESS NOTES

## 2021-12-21 NOTE — H&P
Hospital Medicine History & Physical      PCP: No primary care provider on file. Date of Admission: 2021    Date of Service: Pt seen/examined on 21 and Admitted to Inpatient with expected LOS greater than two midnights due to medical therapy. Chief Complaint:  sob      History Of Present Illness:     39 y.o. female who presented to [facility] with worsening SOB. Pt noted fever on  and noted temps of 101.6 to 103. She did a home covid test on  and was positive. Her family members were also affected. She had assoc myalgias and loss of taste/smell and HAs. This past () she came in given developing sob but after workup and discussion with ER staff, she was sent home with plans for close monitoring.  -she noted some ongoing assoc n/v/diarrhea.  -She checked her o2 sats at home and had dropped to 85%, so  called EMS. ER course: given ivf bolus, decadron    Past Medical History:          Diagnosis Date    Uterus didelphys        Past Surgical History:          Procedure Laterality Date     SECTION      LAMINECTOMY         Medications Prior to Admission:      Prior to Admission medications    Medication Sig Start Date End Date Taking? Authorizing Provider   ondansetron (ZOFRAN) 4 MG tablet Take 1 tablet by mouth 3 times daily as needed for Nausea or Vomiting 21   Daniella Bruner MD   pseudoephedrine (DECONGESTANT) 30 MG tablet Take 1 tablet by mouth every 6 hours as needed for Congestion 21  Daniella Bruner MD       Allergies:  Cephalosporins    Social History:      The patient currently lives at home    TOBACCO:   reports that she has quit smoking. She has never used smokeless tobacco.  ETOH:   reports current alcohol use.   E-Cigarettes/Vaping Use     Questions Responses    E-Cigarette/Vaping Use     Start Date     Passive Exposure     Quit Date     Counseling Given     Comments             Family History: Labs     12/21/21  1356   INR 1.13*     Recent Labs     12/19/21  1424 12/21/21  0536   TROPONINI <0.01 <0.01       Urinalysis:      Lab Results   Component Value Date    NITRU Negative 05/08/2021    BLOODU Negative 05/08/2021    SPECGRAV <=1.005 05/08/2021    GLUCOSEU Negative 05/08/2021       Radiology:     EKG:  I have reviewed the EKG with the following interpretation: nsr, 83, nl axis, no gross ischemic changes noted    CT CHEST PULMONARY EMBOLISM W CONTRAST   Final Result   1. No CT evidence of a pulmonary embolism. 2. Multifocal pneumonia, to include atypical viral causes. 3. Mild mediastinal and bilateral hilar lymphadenopathy, most likely benign   and reactive in etiology given the patient's lung findings. XR CHEST PORTABLE   Final Result   Interval progression of multifocal pneumonia. ASSESSMENT:    Active Hospital Problems    Diagnosis Date Noted    Acute respiratory failure ([address] [J96.00] 12/21/2021    Acute respiratory failure due to COVID-19 ([address] [U07.1, J96.00] 12/21/2021         PLAN:    Acute resp failure- due to covid-19, CTPA neg for PE  Onset of symptoms: 12/13?, home test + on 12/13  -vaccinated, not yet boostered  -procal pending  -iv steroids started 12/21  -inr/fibrinogen/crp/ldh/ferritin ordered  -zinc/vit C ordered  -Vit d pending  -consider rosina vs toci pending clinical course in next 24-48hrs  -supp oxygen given, wean as tolerated    N/v- due to above  Supportive care    Obesity -  With Body mass index is 35.26 kg/m². Complicating assessment and treatment. Placing patient at risk for multiple co-morbidities as well as early death and contributing to the patient's presentation. Counseled on weight loss. DVT Prophylaxis: lovenox bid  Diet: ADULT DIET; Regular  Code Status:FULL    PT/OT Eval Status: not ordered    Dispo - unclear       Liliana Mcclure MD    Thank you No primary care provider on file. for the opportunity to be involved in this patient's care. If you have any questions or concerns please feel free to contact me at [phone].

## 2021-12-21 NOTE — ED PROVIDER NOTES
Emergency Physician Note        Note Open Time: 7:14 AM EST    Chief Complaint  Positive For Covid-19 (covid positive last monday, worsening sob, n/v, loss of appetite) and Shortness of Breath       History of Present Illness  Andria Vickers is a 39 y.o. female who presents to the ED for shortness of breath. Patient reports that overnight her pulse oxygenation was in the 80s and so her  called the ambulance on her behalf. She is Covid positive and her symptoms were 8 days ago. She is currently not taking any antibiotics or any other treatments for Covid. She has no significant medical issues nor any heart or lung diseases. She denies any chest pain but is short of breath. She is having vomiting and nausea and loss of appetite. She also lost her sense of taste and smell. Patient was evaluated in the hospital few days ago and was discharged. Patient states she has worsened since that time. 10 systems reviewed, pertinent positives per HPI otherwise noted to be negative    I have reviewed the following from the nursing documentation:      Prior to Admission medications    Medication Sig Start Date End Date Taking?  Authorizing Provider   ondansetron (ZOFRAN) 4 MG tablet Take 1 tablet by mouth 3 times daily as needed for Nausea or Vomiting 21   Imelda Green MD   pseudoephedrine (DECONGESTANT) 30 MG tablet Take 1 tablet by mouth every 6 hours as needed for Congestion 21  Imelda Green MD       Allergies as of 2021 - Fully Reviewed 2021   Allergen Reaction Noted    Cephalosporins  2021       Past Medical History:   Diagnosis Date    Uterus didelphys         Surgical History:   Past Surgical History:   Procedure Laterality Date     SECTION  2015    LAMINECTOMY          Family History:    Family History   Problem Relation Age of Onset    High Blood Pressure Mother     Cancer Father     Cancer Sister        Social History     Socioeconomic History    Marital status:      Spouse name: Janeth Gonzalez    Number of children: 1    Years of education: 15    Highest education level: Not on file   Occupational History    Occupation: domestic    Tobacco Use    Smoking status: Former Smoker    Smokeless tobacco: Never Used   Substance and Sexual Activity    Alcohol use: Yes     Alcohol/week: 0.0 standard drinks     Comment: occasionally    Drug use: No    Sexual activity: Yes     Partners: Male   Other Topics Concern    Not on file   Social History Narrative    Not on file     Social Determinants of Health     Financial Resource Strain:     Difficulty of Paying Living Expenses: Not on file   Food Insecurity:     Worried About Running Out of Food in the Last Year: Not on file    David of Food in the Last Year: Not on file   Transportation Needs:     Lack of Transportation (Medical): Not on file    Lack of Transportation (Non-Medical): Not on file   Physical Activity:     Days of Exercise per Week: Not on file    Minutes of Exercise per Session: Not on file   Stress:     Feeling of Stress : Not on file   Social Connections:     Frequency of Communication with Friends and Family: Not on file    Frequency of Social Gatherings with Friends and Family: Not on file    Attends Episcopal Services: Not on file    Active Member of [address] [place] [address] or Organizations: Not on file    Attends Club or Organization Meetings: Not on file    Marital Status: Not on file   Intimate Partner Violence:     Fear of Current or Ex-Partner: Not on file    Emotionally Abused: Not on file    Physically Abused: Not on file    Sexually Abused: Not on file   Housing Stability:     Unable to Pay for Housing in the Last Year: Not on file    Number of [place] in the Last Year: Not on file    Unstable Housing in the Last Year: Not on file       Nursing notes reviewed.     ED Triage Vitals [12/21/21 0520]   Enc Vitals Group      /85      Pulse 89 Resp 20      Temp 99.1 °F (37.3 °C)      Temp Source Oral      SpO2 (!) 85 %      Weight 260 lb (117.9 kg)      Height 6' (1.829 m)      Head Circumference       Peak Flow       Pain Score       Pain Loc       Pain Edu? Excl. in [address]? GENERAL:  Awake, alert. Well developed, well nourished with mild respiratory distress. HENT:  Normocephalic, Atraumatic, moist mucous membranes. EYES:  Pupils equal round and reactive to light, Conjunctiva normal, extraocular movements normal.  NECK:  No meningeal signs, Supple. CHEST:  Regular rate and rhythm, chest wall non-tender. LUNGS: Occasional scattered rales bilaterally. ABDOMEN:  Soft, non-tender, no rebound, rigidity or guarding, non-distended, normal bowel sounds. No costovertebral angle tenderness to palpation. BACK:  No tenderness. EXTREMITIES:  Normal range of motion, no edema, no bony tenderness, no deformity, distal pulses present. SKIN: Warm, dry and intact. NEUROLOGIC: Normal mental status. Moving all extremities to command. LABS and DIAGNOSTIC RESULTS  EKG  The Ekg interpreted by me shows  normal sinus rhythm with a rate of 83  Axis is   Normal  QTc is  normal  Intervals and Durations are unremarkable. ST Segments: no acute change  Delta waves, Brugada Syndrome, and Short MS are not present. No significant change from prior EKG dated 19 dec 2021    RADIOLOGY  X-RAYS:  I have reviewed radiologic plain film image(s). ALL OTHER NON-PLAIN FILM IMAGES SUCH AS CT, ULTRASOUND AND MRI HAVE BEEN READ BY THE RADIOLOGIST. XR CHEST PORTABLE   Final Result   Interval progression of multifocal pneumonia.          CT CHEST PULMONARY EMBOLISM W CONTRAST    (Results Pending)        LABS  Labs Reviewed   CBC WITH AUTO DIFFERENTIAL - Abnormal; Notable for the following components:       Result Value    Lymphocytes Absolute 0.7 (*)     All other components within normal limits    Narrative:     Performed at:  [facility]  [address] [place] [address], [facility]   Phone [phone]   COMPREHENSIVE METABOLIC PANEL W/ REFLEX TO MG FOR LOW K - Abnormal; Notable for the following components:    Chloride 98 (*)     Glucose 106 (*)     All other components within normal limits    Narrative:     Performed at:  [place] [address] [facility]   Phone [phone]   BRAIN NATRIURETIC PEPTIDE - Abnormal; Notable for the following components:    Pro- (*)     All other components within normal limits    Narrative:     Performed at:  [place] [address] [facility]   Phone [phone]   D-DIMER, QUANTITATIVE - Abnormal; Notable for the following components:    D-Dimer, Quant 451 (*)     All other components within normal limits    Narrative:     Performed at:  [address], [facility] [employer]   Phone [phone]   CULTURE, BLOOD 1   CULTURE, BLOOD 2   TROPONIN    Narrative:     Performed at:  [address], [facility] [employer]   Phone [phone]   PROCALCITONIN    Narrative:     Performed at:  [address], [facility] [employer]   Phone [phone]   LACTATE, SEPSIS    Narrative:     Performed at:  [place] [address] [facility]   Phone [phone]   SAMPLE POSSIBLE BLOOD BANK TESTING    Narrative:     Performed at:  [place] [address] [facility]   Phone [phone]   LACTATE, [address]          7:16 AM: I discussed the history, physical, and treatment plan with Dr. Faye Gomez. Bob Oh was signed out in stable condition. Please see Dr. Italia Prince note for further details, including diagnosis and disposition.     This chart was generated using the [facility] dictation system. I created this record but it may contain dictation errors.           Byron Márquez MD  12/21/21 1280

## 2021-12-21 NOTE — PROGRESS NOTES
4 Eyes Skin Assessment     The patient is being assess for   Admission    I agree that 2 RN's have performed a thorough Head to Toe Skin Assessment on the patient. ALL assessment sites listed below have been assessed. Areas assessed for pressure by both nurses:   [x]   Head, Face, and Ears   [x]   Shoulders, Back, and Chest, Abdomen  [x]   Arms, Elbows, and Hands   [x]   Coccyx, Sacrum, and Ischium  [x]   Legs, Feet, and Heels        Skin Assessed Under all Medical Devices by both nurses:  None              All Mepilex Borders were peeled back and area peeked at by both nurses:  No: None in use  Please list where Mepilex Borders are located:  NA             **SHARE this note so that the co-signing nurse is able to place an eSignature**    Co-signer eSignature: {Esignature:[id number]}    Does the Patient have Skin Breakdown related to pressure?   No     (Insert Photo here)         Fabrizio Prevention initiated:  No   Wound Care Orders initiated:  No      WOC nurse consulted for Pressure Injury (Stage 3,4, Unstageable, DTI, NWPT, Complex wounds)and New or Established Ostomies:  No      Primary Nurse eSignature: Electronically signed by Chelle Monroe RN on 12/21/21 at 2:46 PM EST

## 2021-12-21 NOTE — ED PROVIDER NOTES
The patient's care was signed out to my by the preceding provider. Please refer to their documentation for further information. CHIEF COMPLAINT  Chief Complaint   Patient presents with    Positive For Covid-19     covid positive last monday, worsening sob, n/v, loss of appetite    Shortness of Breath       Briefly, Aneudy Springer is a 39 y.o. female  who presents to the ED complaining of complaining of shortness of breath    FOCUSED PHYSICAL EXAMINATION  BP (!) 142/89   Pulse 56   Temp 97.7 °F (36.5 °C)   Resp 18   Ht 6' (1.829 m)   Wt 260 lb (117.9 kg)   LMP 03/29/2016   SpO2 92%   BMI 35.26 kg/m²      Focused physical examination:  General appearance:  Cooperative. No acute distress. Skin:  Warm. Dry. Eye:  Extraocular movements intact. Ears, nose, mouth and throat:  Oral mucosa moist,  Neck:  Trachea midline. Heart:  Regular rate and rhythm  Perfusion:  intact  Respiratory: Increased work of breathing with some crackles bilaterally  Abdominal:   Non distended. Nontender  Neurological:  Alert and oriented x 3. Moves all extremities spontaneously  Musculoskeletal:   Normal ROM, no deformities          Psychiatric:  Normal mood      MDM: Patient signed out to me by preceding provider. Covid positive. Shortness of breath. Evidence of acute hypoxic respiratory failure. No evidence of pulmonary embolism. Given steroids and will be admitted to the [facility] time was 30 minutes, excluding separately reportable procedures. There was a high probability of clinically significant/life threatening deterioration in the patient's condition which required my urgent intervention.   This time was spent reviewing the patient chart, interpreting diagnostic/laboratory data, administration of supplemental oxygen for hypoxic respiratory failure      During the patient's ED course, the patient was given:  Medications   0.9 % sodium chloride infusion (has no made by me to ensure accuracy, however some errors may be present due to limitations of this technology.         Enoch Barragan MD  12/23/21 9663

## 2021-12-22 LAB
A/G RATIO: 1.1 (ref 1.1–2.2)
ALBUMIN SERPL-MCNC: 3.2 G/DL (ref 3.4–5)
ALP BLD-CCNC: 49 U/L (ref 40–129)
ALT SERPL-CCNC: 15 U/L (ref 10–40)
ANION GAP SERPL CALCULATED.3IONS-SCNC: 10 MMOL/L (ref 3–16)
AST SERPL-CCNC: 21 U/L (ref 15–37)
BASOPHILS ABSOLUTE: 0 K/UL (ref 0–0.2)
BASOPHILS RELATIVE PERCENT: 0.2 %
BILIRUB SERPL-MCNC: 0.3 MG/DL (ref 0–1)
BUN BLDV-MCNC: 12 MG/DL (ref 7–20)
C-REACTIVE PROTEIN: 85.6 MG/L (ref 0–5.1)
CALCIUM SERPL-MCNC: 8.6 MG/DL (ref 8.3–10.6)
CHLORIDE BLD-SCNC: 104 MMOL/L (ref 99–110)
CO2: 25 MMOL/L (ref 21–32)
CREAT SERPL-MCNC: <0.5 MG/DL (ref 0.6–1.1)
D DIMER: 301 NG/ML DDU (ref 0–229)
EOSINOPHILS ABSOLUTE: 0 K/UL (ref 0–0.6)
EOSINOPHILS RELATIVE PERCENT: 0 %
FIBRINOGEN: 546 MG/DL (ref 200–397)
GFR AFRICAN AMERICAN: >60
GFR NON-AFRICAN AMERICAN: >60
GLUCOSE BLD-MCNC: 121 MG/DL (ref 70–99)
HCT VFR BLD CALC: 33.6 % (ref 36–48)
HEMOGLOBIN: 11.6 G/DL (ref 12–16)
LYMPHOCYTES ABSOLUTE: 0.7 K/UL (ref 1–5.1)
LYMPHOCYTES RELATIVE PERCENT: 27.9 %
MCH RBC QN AUTO: 29.6 PG (ref 26–34)
MCHC RBC AUTO-ENTMCNC: 34.7 G/DL (ref 31–36)
MCV RBC AUTO: 85.4 FL (ref 80–100)
MONOCYTES ABSOLUTE: 0.3 K/UL (ref 0–1.3)
MONOCYTES RELATIVE PERCENT: 11.2 %
NEUTROPHILS ABSOLUTE: 1.5 K/UL (ref 1.7–7.7)
NEUTROPHILS RELATIVE PERCENT: 60.7 %
PDW BLD-RTO: 13.9 % (ref 12.4–15.4)
PLATELET # BLD: 216 K/UL (ref 135–450)
PMV BLD AUTO: 8.3 FL (ref 5–10.5)
POTASSIUM REFLEX MAGNESIUM: 4 MMOL/L (ref 3.5–5.1)
RBC # BLD: 3.93 M/UL (ref 4–5.2)
SODIUM BLD-SCNC: 139 MMOL/L (ref 136–145)
TOTAL PROTEIN: 6.2 G/DL (ref 6.4–8.2)
VITAMIN D 25-HYDROXY: 22 NG/ML
WBC # BLD: 2.5 K/UL (ref 4–11)

## 2021-12-22 PROCEDURE — 86140 C-REACTIVE PROTEIN: CPT

## 2021-12-22 PROCEDURE — 85384 FIBRINOGEN ACTIVITY: CPT

## 2021-12-22 PROCEDURE — 2700000000 HC OXYGEN THERAPY PER DAY

## 2021-12-22 PROCEDURE — 1200000000 HC SEMI PRIVATE

## 2021-12-22 PROCEDURE — 36415 COLL VENOUS BLD VENIPUNCTURE: CPT

## 2021-12-22 PROCEDURE — 82306 VITAMIN D 25 HYDROXY: CPT

## 2021-12-22 PROCEDURE — 6370000000 HC RX 637 (ALT 250 FOR IP): Performed by: INTERNAL MEDICINE

## 2021-12-22 PROCEDURE — 85025 COMPLETE CBC W/AUTO DIFF WBC: CPT

## 2021-12-22 PROCEDURE — 2580000003 HC RX 258: Performed by: INTERNAL MEDICINE

## 2021-12-22 PROCEDURE — 94761 N-INVAS EAR/PLS OXIMETRY MLT: CPT

## 2021-12-22 PROCEDURE — 80053 COMPREHEN METABOLIC PANEL: CPT

## 2021-12-22 PROCEDURE — 85379 FIBRIN DEGRADATION QUANT: CPT

## 2021-12-22 PROCEDURE — 6360000002 HC RX W HCPCS: Performed by: INTERNAL MEDICINE

## 2021-12-22 PROCEDURE — 94640 AIRWAY INHALATION TREATMENT: CPT

## 2021-12-22 RX ORDER — VITAMIN B COMPLEX
1000 TABLET ORAL DAILY
Status: DISCONTINUED | OUTPATIENT
Start: 2021-12-22 | End: 2021-12-26 | Stop reason: HOSPADM

## 2021-12-22 RX ADMIN — Medication 5 MG: at 20:44

## 2021-12-22 RX ADMIN — Medication 2 PUFF: at 08:53

## 2021-12-22 RX ADMIN — ZINC SULFATE 220 MG (50 MG) CAPSULE 50 MG: CAPSULE at 10:33

## 2021-12-22 RX ADMIN — OXYCODONE HYDROCHLORIDE AND ACETAMINOPHEN 500 MG: 500 TABLET ORAL at 17:34

## 2021-12-22 RX ADMIN — ENOXAPARIN SODIUM 30 MG: 100 INJECTION SUBCUTANEOUS at 08:32

## 2021-12-22 RX ADMIN — ACETAMINOPHEN 650 MG: 325 TABLET ORAL at 17:36

## 2021-12-22 RX ADMIN — ENOXAPARIN SODIUM 30 MG: 100 INJECTION SUBCUTANEOUS at 20:43

## 2021-12-22 RX ADMIN — Medication 2 PUFF: at 19:54

## 2021-12-22 RX ADMIN — SODIUM CHLORIDE, PRESERVATIVE FREE 10 ML: 5 INJECTION INTRAVENOUS at 20:44

## 2021-12-22 RX ADMIN — ONDANSETRON 4 MG: 2 INJECTION INTRAMUSCULAR; INTRAVENOUS at 10:37

## 2021-12-22 RX ADMIN — SODIUM CHLORIDE, PRESERVATIVE FREE 10 ML: 5 INJECTION INTRAVENOUS at 08:50

## 2021-12-22 RX ADMIN — METHYLPREDNISOLONE SODIUM SUCCINATE 40 MG: 40 INJECTION, POWDER, FOR SOLUTION INTRAMUSCULAR; INTRAVENOUS at 08:32

## 2021-12-22 RX ADMIN — METHYLPREDNISOLONE SODIUM SUCCINATE 40 MG: 40 INJECTION, POWDER, FOR SOLUTION INTRAMUSCULAR; INTRAVENOUS at 20:43

## 2021-12-22 RX ADMIN — Medication 1000 UNITS: at 17:34

## 2021-12-22 ASSESSMENT — PAIN SCALES - GENERAL
PAINLEVEL_OUTOF10: 0
PAINLEVEL_OUTOF10: 3
PAINLEVEL_OUTOF10: 0
PAINLEVEL_OUTOF10: 6
PAINLEVEL_OUTOF10: 3
PAINLEVEL_OUTOF10: 6

## 2021-12-22 NOTE — PROGRESS NOTES
Pt 86-88 percent this AM on 2 L NC, RN bumped to 3 L NC, pt still only 88% RN increased to 4 L NC, pt 91% on 4 L NC

## 2021-12-22 NOTE — CARE COORDINATION
CASE MANAGEMENT INITIAL ASSESSMENT      Reviewed chart and completed assessment with patient:  Explained Case Management role/services. Primary contact information:  [facility] Decision Maker :   Primary Decision Maker: Maribell Pedraza Spouse - [phone]    Secondary Decision Maker: Jose Daniel Beth - Parent - [phone]          Can this person be reached and be able to respond quickly, such as within a few minutes or hours? Yes  Who would be your back-up decision maker? Name: Berhane Murillo  Phone Number: [phone]    Admit date/status: IP, 12/22/21  Diagnosis:  Acute respiratory failure ([address] Acute respiratory failure with hypoxia ([facility] [address] COVID-19 virus infection, Acute respiratory failure due to COVID-19  Is this a Readmission?:  No      Insurance: No Payor   Precert required for SNF: No       3 night stay required: No    Living arrangements, Adls, care needs, prior to admission: Lives in a two story house with spouse. 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 Transportation: family     Durable Medical Equipment at home:  None  Services in the home and/or outpatient, prior to admission: None      PT/OT recs: None seen at this time. Hospital Exemption Notification (HEN): Needed for SNF not initiated     Barriers to discharge: None    Plan/comments: CM spoke to pt on the phone for initial assessment. Pt plans to return back home with current support system in place at discharge. Will follow for possible new oxygen needs, pt on 4L of oxygen now and not on PTA.  CM following-Lucy Quinn RN       ECOC on chart for MD signature

## 2021-12-22 NOTE — PROGRESS NOTES
Hospitalist Progress Note      PCP: No primary care provider on file. Date of Admission: 12/21/2021      Chief Complaint:  [facility] Course: reviewed     Subjective:  Remains sob, on oxygen 4L , feels better      Medications:  Reviewed    Infusion Medications    sodium chloride      sodium chloride       Scheduled Medications    sodium chloride flush  5-40 mL IntraVENous 2 times per day    enoxaparin  30 mg SubCUTAneous BID    methylPREDNISolone  40 mg IntraVENous Q12H    zinc sulfate  50 mg Oral Daily    ascorbic acid  500 mg Oral QPM    melatonin  5 mg Oral Nightly    budesonide-formoterol  2 puff Inhalation BID     PRN Meds: sodium chloride, sodium chloride flush, sodium chloride, ondansetron **OR** ondansetron, polyethylene glycol, acetaminophen **OR** acetaminophen, guaiFENesin-dextromethorphan    No intake or output data in the 24 hours ending 12/22/21 0938    Physical Exam Performed:    /73   Pulse 61   Temp 98.1 °F (36.7 °C) (Oral)   Resp 18   Ht 6' (1.829 m)   Wt 260 lb (117.9 kg)   LMP 03/29/2016   SpO2 90%   BMI 35.26 kg/m²       General appearance:  mild distress, appears stated age and cooperative. HEENT:  Normal cephalic, atraumatic without obvious deformity. Pupils equal, round, and reactive to light. Extra ocular muscles intact. Conjunctivae/corneas clear. Neck: Supple, with full range of motion. No jugular venous distention. Trachea midline. Respiratory:  inc'd respiratory effort. bilaterally without Wheezes/Rhonchi. bibas rales noted  Cardiovascular:  Regular rate and rhythm with normal S1/S2 without murmurs, rubs or gallops. Abdomen: Soft, non-tender, non-distended with normal bowel sounds. Musculoskeletal:  No clubbing, cyanosis or edema bilaterally. Full range of motion without deformity. Skin: Skin color, texture, turgor normal.  No rashes or lesions. Neurologic:  Neurovascularly intact without any focal sensory/motor deficits.  Cranial nerves: II-XII intact, grossly non-focal.  Psychiatric:  Alert and oriented, thought content appropriate, normal insight  Capillary Refill: Brisk,3 seconds, normal  Peripheral Pulses: +2 palpable, equal bilaterally       Labs:   Recent Labs     12/19/21  1424 12/21/21  0536 12/22/21  0625   WBC 3.3* 4.5 2.5*   HGB 12.4 12.7 11.6*   HCT 35.9* 36.3 33.6*    193 216     Recent Labs     12/19/21  1424 12/21/21  0536 12/22/21  0625   * 136 139   K 3.4* 3.8 4.0   CL 99 98* 104   CO2 22 24 25   BUN 7 7 12   CREATININE 0.6 0.6 <0.5*   CALCIUM 8.5 8.8 8.6     Recent Labs     12/19/21  1424 12/21/21  0536 12/22/21  0625   AST 28 34 21   ALT 16 17 15   BILITOT 0.4 0.5 0.3   ALKPHOS 51 59 49     Recent Labs     12/21/21  1356   INR 1.13*     Recent Labs     12/19/21  1424 12/21/21  0536   TROPONINI <0.01 <0.01       Urinalysis:      Lab Results   Component Value Date    NITRU Negative 05/08/2021    BLOODU Negative 05/08/2021    SPECGRAV <=1.005 05/08/2021    GLUCOSEU Negative 05/08/2021       Radiology:  CT CHEST PULMONARY EMBOLISM W CONTRAST   Final Result   1. No CT evidence of a pulmonary embolism. 2. Multifocal pneumonia, to include atypical viral causes. 3. Mild mediastinal and bilateral hilar lymphadenopathy, most likely benign   and reactive in etiology given the patient's lung findings. XR CHEST PORTABLE   Final Result   Interval progression of multifocal pneumonia.                  Assessment/Plan:    Active Hospital Problems    Diagnosis     Acute respiratory failure ([facility] [address] [J96.00]     Acute respiratory failure due to COVID-19 ([facility] [address] [U07.1, J96.00]      Acute resp failure- due to covid-19, CTPA neg for PE  Onset of symptoms: 12/13?, home test + on 12/13  -vaccinated, not yet boostered  -procal pending  -iv steroids started 12/21  -inr/fibrinogen/crp/ldh/ferritin ordered  -zinc/vit C ordered  -Vit d low, started vit d 12/22  -consider rosina vs toci pending clinical course in next 24-48hrs  -supp oxygen given, wean as tolerated     N/v- due to above  Supportive care     Obesity -  With Body mass index is 35.26 kg/m². Complicating assessment and treatment. Placing patient at risk for multiple co-morbidities as well as early death and contributing to the patient's presentation. Counseled on weight loss    DVT Prophylaxis: lovenox bid  Diet: ADULT DIET;  Regular  Code Status: Full Code    PT/OT Eval Status: not ordered    Dispo - pending improvement, will consier ?rosina vs toci if not improving    Viji Burr MD

## 2021-12-22 NOTE — ACP (ADVANCE CARE PLANNING)
Advance Care Planning     General Advance Care Planning (ACP) Conversation    Date of Conversation: 12/21/2021  Conducted with: Patient with Decision Making Capacity    Healthcare Decision Maker:    Primary Decision Maker: Jak Perrin - [phone]    Secondary Decision Maker: Jose Daniel Galarza - [phone]  Click here to complete Healthcare Decision Makers including selection of the Healthcare Decision Maker Relationship (ie \"Primary\"). Today we documented Decision Maker(s) consistent with Legal Next of Kin hierarchy.     Content/Action Overview:  Has NO ACP documents/care preferences - information provided, considering goals and options  Reviewed DNR/DNI and patient elects Full Code (Attempt Resuscitation)        Length of Voluntary ACP Conversation in minutes:  <16 minutes (Non-Billable)    Timmy Romero RN

## 2021-12-23 LAB
C-REACTIVE PROTEIN: 36.4 MG/L (ref 0–5.1)
D DIMER: 276 NG/ML DDU (ref 0–229)
PROCALCITONIN: 0.07 NG/ML (ref 0–0.15)

## 2021-12-23 PROCEDURE — 6370000000 HC RX 637 (ALT 250 FOR IP): Performed by: INTERNAL MEDICINE

## 2021-12-23 PROCEDURE — 86140 C-REACTIVE PROTEIN: CPT

## 2021-12-23 PROCEDURE — 94640 AIRWAY INHALATION TREATMENT: CPT

## 2021-12-23 PROCEDURE — 85379 FIBRIN DEGRADATION QUANT: CPT

## 2021-12-23 PROCEDURE — 84145 PROCALCITONIN (PCT): CPT

## 2021-12-23 PROCEDURE — 6360000002 HC RX W HCPCS: Performed by: INTERNAL MEDICINE

## 2021-12-23 PROCEDURE — 2580000003 HC RX 258: Performed by: INTERNAL MEDICINE

## 2021-12-23 PROCEDURE — 2700000000 HC OXYGEN THERAPY PER DAY

## 2021-12-23 PROCEDURE — 94761 N-INVAS EAR/PLS OXIMETRY MLT: CPT

## 2021-12-23 PROCEDURE — 36415 COLL VENOUS BLD VENIPUNCTURE: CPT

## 2021-12-23 PROCEDURE — 1200000000 HC SEMI PRIVATE

## 2021-12-23 RX ADMIN — ENOXAPARIN SODIUM 30 MG: 100 INJECTION SUBCUTANEOUS at 20:02

## 2021-12-23 RX ADMIN — ENOXAPARIN SODIUM 30 MG: 100 INJECTION SUBCUTANEOUS at 09:13

## 2021-12-23 RX ADMIN — ZINC SULFATE 220 MG (50 MG) CAPSULE 50 MG: CAPSULE at 09:13

## 2021-12-23 RX ADMIN — Medication 2 PUFF: at 09:19

## 2021-12-23 RX ADMIN — METHYLPREDNISOLONE SODIUM SUCCINATE 40 MG: 40 INJECTION, POWDER, FOR SOLUTION INTRAMUSCULAR; INTRAVENOUS at 09:13

## 2021-12-23 RX ADMIN — SODIUM CHLORIDE, PRESERVATIVE FREE 10 ML: 5 INJECTION INTRAVENOUS at 20:02

## 2021-12-23 RX ADMIN — Medication 1000 UNITS: at 09:13

## 2021-12-23 RX ADMIN — SODIUM CHLORIDE, PRESERVATIVE FREE 10 ML: 5 INJECTION INTRAVENOUS at 09:14

## 2021-12-23 RX ADMIN — METHYLPREDNISOLONE SODIUM SUCCINATE 40 MG: 40 INJECTION, POWDER, FOR SOLUTION INTRAMUSCULAR; INTRAVENOUS at 20:02

## 2021-12-23 RX ADMIN — ONDANSETRON 4 MG: 2 INJECTION INTRAMUSCULAR; INTRAVENOUS at 09:22

## 2021-12-23 RX ADMIN — ACETAMINOPHEN 650 MG: 325 TABLET ORAL at 04:13

## 2021-12-23 RX ADMIN — Medication 2 PUFF: at 20:31

## 2021-12-23 RX ADMIN — OXYCODONE HYDROCHLORIDE AND ACETAMINOPHEN 500 MG: 500 TABLET ORAL at 17:43

## 2021-12-23 RX ADMIN — ACETAMINOPHEN 650 MG: 325 TABLET ORAL at 17:42

## 2021-12-23 RX ADMIN — Medication 5 MG: at 20:02

## 2021-12-23 ASSESSMENT — PAIN SCALES - GENERAL
PAINLEVEL_OUTOF10: 0
PAINLEVEL_OUTOF10: 3
PAINLEVEL_OUTOF10: 3
PAINLEVEL_OUTOF10: 2
PAINLEVEL_OUTOF10: 6
PAINLEVEL_OUTOF10: 0
PAINLEVEL_OUTOF10: 3

## 2021-12-23 NOTE — CARE COORDINATION
CM notes pt currently on 4L of new oxygen and not on home O2. IPTA and lives with spouse with small children, stay at home mom. Will follow for possible new oxygen needs at [place].  aLy Chery, NANNETTE

## 2021-12-23 NOTE — PROGRESS NOTES
Hospitalist Progress Note      PCP: No primary care provider on file. Date of Admission: 12/21/2021    Chief Complaint:  sob     Hospital Course: reviewed      Subjective:  Remains less sob, still on oxygen 4L , feels better compared to arrival    Medications:  Reviewed    Infusion Medications    sodium chloride      sodium chloride       Scheduled Medications    Vitamin D  1,000 Units Oral Daily    sodium chloride flush  5-40 mL IntraVENous 2 times per day    enoxaparin  30 mg SubCUTAneous BID    methylPREDNISolone  40 mg IntraVENous Q12H    zinc sulfate  50 mg Oral Daily    ascorbic acid  500 mg Oral QPM    melatonin  5 mg Oral Nightly    budesonide-formoterol  2 puff Inhalation BID     PRN Meds: sodium chloride, sodium chloride flush, sodium chloride, ondansetron **OR** ondansetron, polyethylene glycol, acetaminophen **OR** acetaminophen, guaiFENesin-dextromethorphan      Intake/Output Summary (Last 24 hours) at 12/23/2021 1017  Last data filed at 12/22/2021 1840  Gross per 24 hour   Intake 1600 ml   Output --   Net 1600 ml       Physical Exam Performed:    BP (!) 142/94   Pulse 62   Temp 98.5 °F (36.9 °C) (Oral)   Resp 18   Ht 6' (1.829 m)   Wt 260 lb (117.9 kg)   LMP 03/29/2016   SpO2 91%   BMI 35.26 kg/m²     General appearance:  no distress, appears stated age and cooperative. HEENT:  Normal cephalic, atraumatic without obvious deformity. Pupils equal, round, and reactive to light.  Extra ocular muscles intact. Conjunctivae/corneas clear. Neck: Supple, with full range of motion. No jugular venous distention. Trachea midline. Respiratory:  improved respiratory effort. bilaterally without Wheezes/Rhonchi. bibas rales noted  Cardiovascular:  Regular rate and rhythm with normal S1/S2 without murmurs, rubs or gallops. Abdomen: Soft, non-tender, non-distended with normal bowel sounds.   Musculoskeletal:  No clubbing, cyanosis or edema bilaterally.  Full range of motion without deformity. Skin: Skin color, texture, turgor normal.  No rashes or lesions. Neurologic:  Neurovascularly intact without any focal sensory/motor deficits. Cranial nerves: II-XII intact, grossly non-focal.  Psychiatric:  Alert and oriented, thought content appropriate, normal insight  Capillary Refill: Brisk,3 seconds, normal  Peripheral Pulses: +2 palpable, equal bilaterally          Labs:   Recent Labs     12/21/21  0536 12/22/21  0625   WBC 4.5 2.5*   HGB 12.7 11.6*   HCT 36.3 33.6*    216     Recent Labs     12/21/21  0536 12/22/21  0625    139   K 3.8 4.0   CL 98* 104   CO2 24 25   BUN 7 12   CREATININE 0.6 <0.5*   CALCIUM 8.8 8.6     Recent Labs     12/21/21  0536 12/22/21  0625   AST 34 21   ALT 17 15   BILITOT 0.5 0.3   ALKPHOS 59 49     Recent Labs     12/21/21  1356   INR 1.13*     Recent Labs     12/21/21  0536   TROPONINI <0.01       Urinalysis:      Lab Results   Component Value Date    NITRU Negative 05/08/2021    BLOODU Negative 05/08/2021    SPECGRAV <=1.005 05/08/2021    GLUCOSEU Negative 05/08/2021       Radiology:  CT CHEST PULMONARY EMBOLISM W CONTRAST   Final Result   1. No CT evidence of a pulmonary embolism. 2. Multifocal pneumonia, to include atypical viral causes. 3. Mild mediastinal and bilateral hilar lymphadenopathy, most likely benign   and reactive in etiology given the patient's lung findings. XR CHEST PORTABLE   Final Result   Interval progression of multifocal pneumonia.                  Assessment/Plan:    Active Hospital Problems    Diagnosis     Acute respiratory failure ([facility] [address] [J96.00]     Acute respiratory failure due to COVID-19 ([facility] [address] [U07.1, J96.00]          Acute resp failure- due to covid-19, CTPA neg for PE  Onset of symptoms: 12/13?, home test + on 12/13  -vaccinated, not yet boostered  -procal wnl  -iv steroids started 12/21  -inr/fibrinogen/crp/ldh/ferritin ordered  -zinc/vit C ordered  -Vit d low, started vit d 12/22  -considered rosina vs toci pending clinical course  -supp oxygen given, wean as tolerated     N/v- due to above  Supportive care     Obesity -  With Body mass index is 25.06 kg/m². Complicating assessment and treatment. Placing patient at risk for multiple co-morbidities as well as early death and contributing to the patient's presentation. Counseled on weight loss     DVT Prophylaxis: lovenox bid  Diet: ADULT DIET;  Regular  Code Status: Full Code      PT/OT Eval Status: not ordered     Dispo - pending improvement, likely by Xiomara/Mon, will consier ?rosina vs toci if not improving    Mayito De Oliveira MD

## 2021-12-24 LAB
ALBUMIN SERPL-MCNC: 3.3 G/DL (ref 3.4–5)
ANION GAP SERPL CALCULATED.3IONS-SCNC: 11 MMOL/L (ref 3–16)
ANISOCYTOSIS: ABNORMAL
ATYPICAL LYMPHOCYTE RELATIVE PERCENT: 1 % (ref 0–6)
BANDED NEUTROPHILS RELATIVE PERCENT: 11 % (ref 0–7)
BASOPHILS ABSOLUTE: 0 K/UL (ref 0–0.2)
BASOPHILS RELATIVE PERCENT: 0 %
BUN BLDV-MCNC: 22 MG/DL (ref 7–20)
CALCIUM SERPL-MCNC: 8.6 MG/DL (ref 8.3–10.6)
CHLORIDE BLD-SCNC: 103 MMOL/L (ref 99–110)
CO2: 24 MMOL/L (ref 21–32)
CREAT SERPL-MCNC: <0.5 MG/DL (ref 0.6–1.1)
D DIMER: 251 NG/ML DDU (ref 0–229)
EOSINOPHILS ABSOLUTE: 0 K/UL (ref 0–0.6)
EOSINOPHILS RELATIVE PERCENT: 0 %
GFR AFRICAN AMERICAN: >60
GFR NON-AFRICAN AMERICAN: >60
GLUCOSE BLD-MCNC: 119 MG/DL (ref 70–99)
HCT VFR BLD CALC: 36.4 % (ref 36–48)
HEMOGLOBIN: 12.7 G/DL (ref 12–16)
LYMPHOCYTES ABSOLUTE: 1.2 K/UL (ref 1–5.1)
LYMPHOCYTES RELATIVE PERCENT: 16 %
MAGNESIUM: 2.4 MG/DL (ref 1.8–2.4)
MCH RBC QN AUTO: 29.7 PG (ref 26–34)
MCHC RBC AUTO-ENTMCNC: 34.8 G/DL (ref 31–36)
MCV RBC AUTO: 85.3 FL (ref 80–100)
METAMYELOCYTES RELATIVE PERCENT: 1 %
MONOCYTES ABSOLUTE: 0.3 K/UL (ref 0–1.3)
MONOCYTES RELATIVE PERCENT: 4 %
NEUTROPHILS ABSOLUTE: 5.7 K/UL (ref 1.7–7.7)
NEUTROPHILS RELATIVE PERCENT: 67 %
NUCLEATED RED BLOOD CELLS: 1 /100 WBC
PDW BLD-RTO: 13.3 % (ref 12.4–15.4)
PHOSPHORUS: 3.8 MG/DL (ref 2.5–4.9)
PLATELET # BLD: 338 K/UL (ref 135–450)
PMV BLD AUTO: 7.4 FL (ref 5–10.5)
POLYCHROMASIA: ABNORMAL
POTASSIUM SERPL-SCNC: 4.5 MMOL/L (ref 3.5–5.1)
RBC # BLD: 4.26 M/UL (ref 4–5.2)
SODIUM BLD-SCNC: 138 MMOL/L (ref 136–145)
WBC # BLD: 7.2 K/UL (ref 4–11)

## 2021-12-24 PROCEDURE — XW0DXM6 INTRODUCTION OF BARICITINIB INTO MOUTH AND PHARYNX, EXTERNAL APPROACH, NEW TECHNOLOGY GROUP 6: ICD-10-PCS | Performed by: INTERNAL MEDICINE

## 2021-12-24 PROCEDURE — 94761 N-INVAS EAR/PLS OXIMETRY MLT: CPT

## 2021-12-24 PROCEDURE — 2580000003 HC RX 258: Performed by: INTERNAL MEDICINE

## 2021-12-24 PROCEDURE — 80069 RENAL FUNCTION PANEL: CPT

## 2021-12-24 PROCEDURE — 1200000000 HC SEMI PRIVATE

## 2021-12-24 PROCEDURE — 94640 AIRWAY INHALATION TREATMENT: CPT

## 2021-12-24 PROCEDURE — 83735 ASSAY OF MAGNESIUM: CPT

## 2021-12-24 PROCEDURE — 85025 COMPLETE CBC W/AUTO DIFF WBC: CPT

## 2021-12-24 PROCEDURE — 36415 COLL VENOUS BLD VENIPUNCTURE: CPT

## 2021-12-24 PROCEDURE — 2700000000 HC OXYGEN THERAPY PER DAY

## 2021-12-24 PROCEDURE — 6360000002 HC RX W HCPCS: Performed by: INTERNAL MEDICINE

## 2021-12-24 PROCEDURE — 6370000000 HC RX 637 (ALT 250 FOR IP): Performed by: INTERNAL MEDICINE

## 2021-12-24 PROCEDURE — 85379 FIBRIN DEGRADATION QUANT: CPT

## 2021-12-24 RX ORDER — GUAIFENESIN 600 MG/1
600 TABLET, EXTENDED RELEASE ORAL 2 TIMES DAILY
Status: DISCONTINUED | OUTPATIENT
Start: 2021-12-24 | End: 2021-12-26 | Stop reason: HOSPADM

## 2021-12-24 RX ADMIN — ENOXAPARIN SODIUM 30 MG: 100 INJECTION SUBCUTANEOUS at 09:00

## 2021-12-24 RX ADMIN — Medication 1000 UNITS: at 09:01

## 2021-12-24 RX ADMIN — ZINC SULFATE 220 MG (50 MG) CAPSULE 50 MG: CAPSULE at 09:01

## 2021-12-24 RX ADMIN — SODIUM CHLORIDE, PRESERVATIVE FREE 10 ML: 5 INJECTION INTRAVENOUS at 21:42

## 2021-12-24 RX ADMIN — BARICITINIB 4 MG: 2 TABLET, FILM COATED ORAL at 11:44

## 2021-12-24 RX ADMIN — METHYLPREDNISOLONE SODIUM SUCCINATE 40 MG: 40 INJECTION, POWDER, FOR SOLUTION INTRAMUSCULAR; INTRAVENOUS at 09:02

## 2021-12-24 RX ADMIN — OXYCODONE HYDROCHLORIDE AND ACETAMINOPHEN 500 MG: 500 TABLET ORAL at 18:29

## 2021-12-24 RX ADMIN — ACETAMINOPHEN 650 MG: 325 TABLET ORAL at 03:23

## 2021-12-24 RX ADMIN — ENOXAPARIN SODIUM 30 MG: 100 INJECTION SUBCUTANEOUS at 21:37

## 2021-12-24 RX ADMIN — Medication 2 PUFF: at 08:34

## 2021-12-24 RX ADMIN — Medication 10 ML: at 09:02

## 2021-12-24 RX ADMIN — GUAIFENESIN 600 MG: 600 TABLET, EXTENDED RELEASE ORAL at 11:44

## 2021-12-24 RX ADMIN — METHYLPREDNISOLONE SODIUM SUCCINATE 40 MG: 40 INJECTION, POWDER, FOR SOLUTION INTRAMUSCULAR; INTRAVENOUS at 21:38

## 2021-12-24 RX ADMIN — ONDANSETRON 4 MG: 4 TABLET, ORALLY DISINTEGRATING ORAL at 09:10

## 2021-12-24 RX ADMIN — Medication 2 PUFF: at 20:00

## 2021-12-24 RX ADMIN — Medication 5 MG: at 21:37

## 2021-12-24 RX ADMIN — GUAIFENESIN 600 MG: 600 TABLET, EXTENDED RELEASE ORAL at 21:37

## 2021-12-24 ASSESSMENT — PAIN SCALES - GENERAL
PAINLEVEL_OUTOF10: 4
PAINLEVEL_OUTOF10: 6
PAINLEVEL_OUTOF10: 0

## 2021-12-24 NOTE — CONSULTS
Pharmacy Consult  Baricitinib vs. Tocilizumab Candidate          Ashley Hdz is a 39 y.o. female being treated for COVID-19; consult received from Dr. Chanda Fontanez to determine if patient is a candidate for Baricitinib vs. Tocilizumab. - Tested positive for COVID using home test on 12/13/21  - D-dimer elevated (251)  - CRP elevated (36.4)  - GFR > 60  - Requiring O2 supplementation; 90% on 4L NC    Given that patient is requiring < 6L NC, I would recommend Baricitinib 4 mg daily x14 doses based on updated COVID treatment algorithm.     Perfect served Dr. Jenny VELARDE to place order for Baricitinib    Baricitinib 4 mg daily x14 doses ordered    Thank you for the consult,  Alis Torres PharmD 12/24/2021  11:18 AM

## 2021-12-24 NOTE — PROGRESS NOTES
Hospitalist Progress Note      PCP: No primary care provider on file. Date of Admission: 12/21/2021    Chief Complaint:  sob     Hospital Course: reviewed      Subjective:  Remains less sob, still on oxygen 4L , feels better compared to arrival. Having dry cough. Medications:  Reviewed    Infusion Medications    sodium chloride      sodium chloride       Scheduled Medications    guaiFENesin  600 mg Oral BID    baricitinib  4 mg Oral Daily    Vitamin D  1,000 Units Oral Daily    sodium chloride flush  5-40 mL IntraVENous 2 times per day    enoxaparin  30 mg SubCUTAneous BID    methylPREDNISolone  40 mg IntraVENous Q12H    zinc sulfate  50 mg Oral Daily    ascorbic acid  500 mg Oral QPM    melatonin  5 mg Oral Nightly    budesonide-formoterol  2 puff Inhalation BID     PRN Meds: sodium chloride, sodium chloride flush, sodium chloride, ondansetron **OR** ondansetron, polyethylene glycol, acetaminophen **OR** acetaminophen, guaiFENesin-dextromethorphan    No intake or output data in the 24 hours ending 12/24/21 1410    Physical Exam Performed:    /79   Pulse 78   Temp 97.5 °F (36.4 °C)   Resp 18   Ht 6' (1.829 m)   Wt 260 lb (117.9 kg)   LMP 03/29/2016   SpO2 90%   BMI 35.26 kg/m²     General appearance:  no distress, appears stated age and cooperative. HEENT:  Normal cephalic, atraumatic without obvious deformity. Pupils equal, round, and reactive to light.  Extra ocular muscles intact. Conjunctivae/corneas clear. Neck: Supple, with full range of motion. No jugular venous distention. Trachea midline. Respiratory:  improved respiratory effort. bilaterally without Wheezes/Rhonchi. bibas rales noted  Cardiovascular:  Regular rate and rhythm with normal S1/S2 without murmurs, rubs or gallops. Abdomen: Soft, non-tender, non-distended with normal bowel sounds. Musculoskeletal:  No clubbing, cyanosis or edema bilaterally.  Full range of motion without deformity.   Skin: Skin color, texture, turgor normal.  No rashes or lesions. Neurologic:  Neurovascularly intact without any focal sensory/motor deficits. Cranial nerves: II-XII intact, grossly non-focal.  Psychiatric:  Alert and oriented, thought content appropriate, normal insight  Capillary Refill: Brisk,3 seconds, normal  Peripheral Pulses: +2 palpable, equal bilaterally          Labs:   Recent Labs     12/22/21  0625 12/24/21  0749   WBC 2.5* 7.2   HGB 11.6* 12.7   HCT 33.6* 36.4    338     Recent Labs     12/22/21  0625 12/24/21  0749    138   K 4.0 4.5    103   CO2 25 24   BUN 12 22*   CREATININE <0.5* <0.5*   CALCIUM 8.6 8.6   PHOS  --  3.8     Recent Labs     12/22/21  0625   AST 21   ALT 15   BILITOT 0.3   ALKPHOS 49     No results for input(s): INR in the last 72 hours. No results for input(s): Meliza Sanford in the last 72 hours. Urinalysis:      Lab Results   Component Value Date    NITRU Negative 05/08/2021    BLOODU Negative 05/08/2021    SPECGRAV <=1.005 05/08/2021    GLUCOSEU Negative 05/08/2021       Radiology:  CT CHEST PULMONARY EMBOLISM W CONTRAST   Final Result   1. No CT evidence of a pulmonary embolism. 2. Multifocal pneumonia, to include atypical viral causes. 3. Mild mediastinal and bilateral hilar lymphadenopathy, most likely benign   and reactive in etiology given the patient's lung findings. XR CHEST PORTABLE   Final Result   Interval progression of multifocal pneumonia.                  Assessment/Plan:    Active Hospital Problems    Diagnosis     Acute respiratory failure ([facility] [address] [J96.00]     Acute respiratory failure due to COVID-19 ([facility] [address] [U07.1, J96.00]          Acute resp failure- due to covid-19, CTPA neg for PE  Onset of symptoms: 12/13?, home test + on 12/13  -vaccinated, not yet boostered  -procal wnl  -iv steroids started 12/21  -inr/fibrinogen/crp/ldh/ferritin ordered  -zinc/vit C ordered  -Vit d low, started vit d 12/22  -will start rosina today   -supp oxygen given, wean as tolerated     N/v- due to above  Supportive care     Obesity -  With Body mass index is 34.78 kg/m². Complicating assessment and treatment. Placing patient at risk for multiple co-morbidities as well as early death and contributing to the patient's presentation. Counseled on weight loss     DVT Prophylaxis: lovenox bid  Diet: ADULT DIET;  Regular  Code Status: Full Code      PT/OT Eval Status: not ordered     Dispo - pending improvement    Radu Gomez MD

## 2021-12-25 LAB
ALBUMIN SERPL-MCNC: 3.3 G/DL (ref 3.4–5)
ANION GAP SERPL CALCULATED.3IONS-SCNC: 11 MMOL/L (ref 3–16)
BANDED NEUTROPHILS RELATIVE PERCENT: 3 % (ref 0–7)
BASOPHILS ABSOLUTE: 0 K/UL (ref 0–0.2)
BASOPHILS RELATIVE PERCENT: 0 %
BUN BLDV-MCNC: 20 MG/DL (ref 7–20)
CALCIUM SERPL-MCNC: 8.9 MG/DL (ref 8.3–10.6)
CHLORIDE BLD-SCNC: 101 MMOL/L (ref 99–110)
CO2: 23 MMOL/L (ref 21–32)
CREAT SERPL-MCNC: <0.5 MG/DL (ref 0.6–1.1)
CULTURE, BLOOD 2: NORMAL
EOSINOPHILS ABSOLUTE: 0 K/UL (ref 0–0.6)
EOSINOPHILS RELATIVE PERCENT: 0 %
GFR AFRICAN AMERICAN: >60
GFR NON-AFRICAN AMERICAN: >60
GLUCOSE BLD-MCNC: 124 MG/DL (ref 70–99)
HCT VFR BLD CALC: 36.6 % (ref 36–48)
HEMOGLOBIN: 12.6 G/DL (ref 12–16)
LYMPHOCYTES ABSOLUTE: 1.6 K/UL (ref 1–5.1)
LYMPHOCYTES RELATIVE PERCENT: 18 %
MAGNESIUM: 2.4 MG/DL (ref 1.8–2.4)
MCH RBC QN AUTO: 29.3 PG (ref 26–34)
MCHC RBC AUTO-ENTMCNC: 34.5 G/DL (ref 31–36)
MCV RBC AUTO: 85 FL (ref 80–100)
METAMYELOCYTES RELATIVE PERCENT: 4 %
MONOCYTES ABSOLUTE: 0.4 K/UL (ref 0–1.3)
MONOCYTES RELATIVE PERCENT: 5 %
NEUTROPHILS ABSOLUTE: 6.9 K/UL (ref 1.7–7.7)
NEUTROPHILS RELATIVE PERCENT: 70 %
PDW BLD-RTO: 13.5 % (ref 12.4–15.4)
PHOSPHORUS: 3.5 MG/DL (ref 2.5–4.9)
PLATELET # BLD: 341 K/UL (ref 135–450)
PLATELET SLIDE REVIEW: ADEQUATE
PMV BLD AUTO: 7.7 FL (ref 5–10.5)
POTASSIUM SERPL-SCNC: 4.5 MMOL/L (ref 3.5–5.1)
RBC # BLD: 4.31 M/UL (ref 4–5.2)
SLIDE REVIEW: ABNORMAL
SODIUM BLD-SCNC: 135 MMOL/L (ref 136–145)
WBC # BLD: 8.9 K/UL (ref 4–11)

## 2021-12-25 PROCEDURE — 85025 COMPLETE CBC W/AUTO DIFF WBC: CPT

## 2021-12-25 PROCEDURE — 2580000003 HC RX 258: Performed by: INTERNAL MEDICINE

## 2021-12-25 PROCEDURE — 6360000002 HC RX W HCPCS: Performed by: INTERNAL MEDICINE

## 2021-12-25 PROCEDURE — 83735 ASSAY OF MAGNESIUM: CPT

## 2021-12-25 PROCEDURE — 94640 AIRWAY INHALATION TREATMENT: CPT

## 2021-12-25 PROCEDURE — 80069 RENAL FUNCTION PANEL: CPT

## 2021-12-25 PROCEDURE — 1200000000 HC SEMI PRIVATE

## 2021-12-25 PROCEDURE — 6370000000 HC RX 637 (ALT 250 FOR IP): Performed by: INTERNAL MEDICINE

## 2021-12-25 PROCEDURE — 94761 N-INVAS EAR/PLS OXIMETRY MLT: CPT

## 2021-12-25 PROCEDURE — 2700000000 HC OXYGEN THERAPY PER DAY

## 2021-12-25 PROCEDURE — 36415 COLL VENOUS BLD VENIPUNCTURE: CPT

## 2021-12-25 RX ADMIN — ENOXAPARIN SODIUM 30 MG: 100 INJECTION SUBCUTANEOUS at 21:11

## 2021-12-25 RX ADMIN — SODIUM CHLORIDE, PRESERVATIVE FREE 10 ML: 5 INJECTION INTRAVENOUS at 21:10

## 2021-12-25 RX ADMIN — METHYLPREDNISOLONE SODIUM SUCCINATE 40 MG: 40 INJECTION, POWDER, FOR SOLUTION INTRAMUSCULAR; INTRAVENOUS at 10:47

## 2021-12-25 RX ADMIN — OXYCODONE HYDROCHLORIDE AND ACETAMINOPHEN 500 MG: 500 TABLET ORAL at 18:54

## 2021-12-25 RX ADMIN — Medication 5 MG: at 21:09

## 2021-12-25 RX ADMIN — ONDANSETRON 4 MG: 4 TABLET, ORALLY DISINTEGRATING ORAL at 10:54

## 2021-12-25 RX ADMIN — BARICITINIB 4 MG: 2 TABLET, FILM COATED ORAL at 10:47

## 2021-12-25 RX ADMIN — SODIUM CHLORIDE, PRESERVATIVE FREE 10 ML: 5 INJECTION INTRAVENOUS at 10:59

## 2021-12-25 RX ADMIN — Medication 2 PUFF: at 09:00

## 2021-12-25 RX ADMIN — GUAIFENESIN 600 MG: 600 TABLET, EXTENDED RELEASE ORAL at 10:46

## 2021-12-25 RX ADMIN — Medication 1000 UNITS: at 10:46

## 2021-12-25 RX ADMIN — Medication 2 PUFF: at 20:15

## 2021-12-25 RX ADMIN — ACETAMINOPHEN 650 MG: 325 TABLET ORAL at 21:09

## 2021-12-25 RX ADMIN — ENOXAPARIN SODIUM 30 MG: 100 INJECTION SUBCUTANEOUS at 10:54

## 2021-12-25 RX ADMIN — GUAIFENESIN 600 MG: 600 TABLET, EXTENDED RELEASE ORAL at 21:09

## 2021-12-25 RX ADMIN — METHYLPREDNISOLONE SODIUM SUCCINATE 40 MG: 40 INJECTION, POWDER, FOR SOLUTION INTRAMUSCULAR; INTRAVENOUS at 21:11

## 2021-12-25 RX ADMIN — ZINC SULFATE 220 MG (50 MG) CAPSULE 50 MG: CAPSULE at 10:46

## 2021-12-25 ASSESSMENT — PAIN DESCRIPTION - PAIN TYPE: TYPE: ACUTE PAIN

## 2021-12-25 ASSESSMENT — PAIN SCALES - GENERAL
PAINLEVEL_OUTOF10: 6
PAINLEVEL_OUTOF10: 4
PAINLEVEL_OUTOF10: 0
PAINLEVEL_OUTOF10: 0

## 2021-12-25 ASSESSMENT — PAIN DESCRIPTION - LOCATION: LOCATION: BACK

## 2021-12-25 NOTE — PROGRESS NOTES
Hospitalist Progress Note      PCP: No primary care provider on file. Date of Admission: 12/21/2021    Chief Complaint:  sob     Hospital Course: reviewed      Subjective:  Remains less sob, down to 1 L NC oxygen, feels better compared to arrival.    Medications:  Reviewed    Infusion Medications    sodium chloride      sodium chloride       Scheduled Medications    guaiFENesin  600 mg Oral BID    baricitinib  4 mg Oral Daily    Vitamin D  1,000 Units Oral Daily    sodium chloride flush  5-40 mL IntraVENous 2 times per day    enoxaparin  30 mg SubCUTAneous BID    methylPREDNISolone  40 mg IntraVENous Q12H    zinc sulfate  50 mg Oral Daily    ascorbic acid  500 mg Oral QPM    melatonin  5 mg Oral Nightly    budesonide-formoterol  2 puff Inhalation BID     PRN Meds: sodium chloride, sodium chloride flush, sodium chloride, ondansetron **OR** ondansetron, polyethylene glycol, acetaminophen **OR** acetaminophen, guaiFENesin-dextromethorphan    No intake or output data in the 24 hours ending 12/25/21 1243    Physical Exam Performed:    /78   Pulse 56   Temp 97.5 °F (36.4 °C) (Oral)   Resp 18   Ht 6' (1.829 m)   Wt 260 lb (117.9 kg)   LMP 03/29/2016   SpO2 94%   BMI 35.26 kg/m²     General appearance:  no distress, appears stated age and cooperative. HEENT:  Normal cephalic, atraumatic without obvious deformity. Pupils equal, round, and reactive to light.  Extra ocular muscles intact. Conjunctivae/corneas clear. Neck: Supple, with full range of motion. No jugular venous distention. Trachea midline. Respiratory:  improved respiratory effort. bilaterally without Wheezes/Rhonchi. bibas rales noted  Cardiovascular:  Regular rate and rhythm with normal S1/S2 without murmurs, rubs or gallops. Abdomen: Soft, non-tender, non-distended with normal bowel sounds. Musculoskeletal:  No clubbing, cyanosis or edema bilaterally.  Full range of motion without deformity.   Skin: Skin color, texture, turgor normal.  No rashes or lesions. Neurologic:  Neurovascularly intact without any focal sensory/motor deficits. Cranial nerves: II-XII intact, grossly non-focal.  Psychiatric:  Alert and oriented, thought content appropriate, normal insight  Capillary Refill: Brisk,3 seconds, normal  Peripheral Pulses: +2 palpable, equal bilaterally          Labs:   Recent Labs     12/24/21  0749 12/25/21  0648   WBC 7.2 8.9   HGB 12.7 12.6   HCT 36.4 36.6    341     Recent Labs     12/24/21  0749 12/25/21  0648    135*   K 4.5 4.5    101   CO2 24 23   BUN 22* 20   CREATININE <0.5* <0.5*   CALCIUM 8.6 8.9   PHOS 3.8 3.5     No results for input(s): AST, ALT, BILIDIR, BILITOT, ALKPHOS in the last 72 hours. No results for input(s): INR in the last 72 hours. No results for input(s): Evonnie Montane in the last 72 hours. Urinalysis:      Lab Results   Component Value Date    NITRU Negative 05/08/2021    BLOODU Negative 05/08/2021    SPECGRAV <=1.005 05/08/2021    GLUCOSEU Negative 05/08/2021       Radiology:  CT CHEST PULMONARY EMBOLISM W CONTRAST   Final Result   1. No CT evidence of a pulmonary embolism. 2. Multifocal pneumonia, to include atypical viral causes. 3. Mild mediastinal and bilateral hilar lymphadenopathy, most likely benign   and reactive in etiology given the patient's lung findings. XR CHEST PORTABLE   Final Result   Interval progression of multifocal pneumonia.                  Assessment/Plan:    Active Hospital Problems    Diagnosis     Acute respiratory failure ([facility] [address] [J96.00]     Acute respiratory failure due to COVID-19 ([facility] [address] [U07.1, J96.00]          Acute resp failure- due to covid-19, CTPA neg for PE  Onset of symptoms: 12/13?, home test + on 12/13  -vaccinated, not yet boostered  -procal wnl  -iv steroids started on 12/21  -inr/fibrinogen/crp/ldh/ferritin ordered  -zinc/vit C ordered  -Vit d low, started vit d 12/22  -started rosina on 12/24  -supp oxygen given, wean as tolerated     N/v- due to above  Supportive care     Obesity -  With Body mass index is 16.47 kg/m². Complicating assessment and treatment. Placing patient at risk for multiple co-morbidities as well as early death and contributing to the patient's presentation. Counseled on weight loss     DVT Prophylaxis: lovenox bid  Diet: ADULT DIET;  Regular  Code Status: Full Code      PT/OT Eval Status: not ordered     Dispo - pending improvement    Saad Gates MD

## 2021-12-26 VITALS
DIASTOLIC BLOOD PRESSURE: 72 MMHG | OXYGEN SATURATION: 92 % | WEIGHT: 260 LBS | HEIGHT: 72 IN | SYSTOLIC BLOOD PRESSURE: 111 MMHG | RESPIRATION RATE: 18 BRPM | TEMPERATURE: 97.6 F | BODY MASS INDEX: 35.21 KG/M2 | HEART RATE: 66 BPM

## 2021-12-26 LAB
ALBUMIN SERPL-MCNC: 3.5 G/DL (ref 3.4–5)
ANION GAP SERPL CALCULATED.3IONS-SCNC: 12 MMOL/L (ref 3–16)
ANISOCYTOSIS: ABNORMAL
ATYPICAL LYMPHOCYTE RELATIVE PERCENT: 4 % (ref 0–6)
BANDED NEUTROPHILS RELATIVE PERCENT: 7 % (ref 0–7)
BASOPHILS ABSOLUTE: 0 K/UL (ref 0–0.2)
BASOPHILS RELATIVE PERCENT: 0 %
BUN BLDV-MCNC: 18 MG/DL (ref 7–20)
CALCIUM SERPL-MCNC: 9 MG/DL (ref 8.3–10.6)
CHLORIDE BLD-SCNC: 103 MMOL/L (ref 99–110)
CO2: 23 MMOL/L (ref 21–32)
CREAT SERPL-MCNC: <0.5 MG/DL (ref 0.6–1.1)
EOSINOPHILS ABSOLUTE: 0 K/UL (ref 0–0.6)
EOSINOPHILS RELATIVE PERCENT: 0 %
GFR AFRICAN AMERICAN: >60
GFR NON-AFRICAN AMERICAN: >60
GLUCOSE BLD-MCNC: 116 MG/DL (ref 70–99)
HCT VFR BLD CALC: 39.5 % (ref 36–48)
HEMOGLOBIN: 13.5 G/DL (ref 12–16)
LYMPHOCYTES ABSOLUTE: 2.9 K/UL (ref 1–5.1)
LYMPHOCYTES RELATIVE PERCENT: 24 %
MAGNESIUM: 2.3 MG/DL (ref 1.8–2.4)
MCH RBC QN AUTO: 29.3 PG (ref 26–34)
MCHC RBC AUTO-ENTMCNC: 34.1 G/DL (ref 31–36)
MCV RBC AUTO: 86 FL (ref 80–100)
MONOCYTES ABSOLUTE: 0.5 K/UL (ref 0–1.3)
MONOCYTES RELATIVE PERCENT: 5 %
MYELOCYTE PERCENT: 2 %
NEUTROPHILS ABSOLUTE: 7 K/UL (ref 1.7–7.7)
NEUTROPHILS RELATIVE PERCENT: 58 %
PDW BLD-RTO: 13.4 % (ref 12.4–15.4)
PHOSPHORUS: 4.6 MG/DL (ref 2.5–4.9)
PLATELET # BLD: 393 K/UL (ref 135–450)
PMV BLD AUTO: 7.1 FL (ref 5–10.5)
POLYCHROMASIA: ABNORMAL
POTASSIUM SERPL-SCNC: 4.8 MMOL/L (ref 3.5–5.1)
RBC # BLD: 4.59 M/UL (ref 4–5.2)
SODIUM BLD-SCNC: 138 MMOL/L (ref 136–145)
WBC # BLD: 10.5 K/UL (ref 4–11)

## 2021-12-26 PROCEDURE — 83735 ASSAY OF MAGNESIUM: CPT

## 2021-12-26 PROCEDURE — 36415 COLL VENOUS BLD VENIPUNCTURE: CPT

## 2021-12-26 PROCEDURE — 80069 RENAL FUNCTION PANEL: CPT

## 2021-12-26 PROCEDURE — 6370000000 HC RX 637 (ALT 250 FOR IP): Performed by: INTERNAL MEDICINE

## 2021-12-26 PROCEDURE — 94640 AIRWAY INHALATION TREATMENT: CPT

## 2021-12-26 PROCEDURE — 85025 COMPLETE CBC W/AUTO DIFF WBC: CPT

## 2021-12-26 PROCEDURE — 6360000002 HC RX W HCPCS: Performed by: INTERNAL MEDICINE

## 2021-12-26 PROCEDURE — 2580000003 HC RX 258: Performed by: INTERNAL MEDICINE

## 2021-12-26 RX ORDER — ASCORBIC ACID 500 MG
500 TABLET ORAL EVERY EVENING
Qty: 30 TABLET | Refills: 3 | Status: SHIPPED | OUTPATIENT
Start: 2021-12-26

## 2021-12-26 RX ORDER — VITAMIN B COMPLEX
1000 TABLET ORAL DAILY
Qty: 60 TABLET | Refills: 3 | Status: SHIPPED | OUTPATIENT
Start: 2021-12-27

## 2021-12-26 RX ORDER — ZINC SULFATE 50(220)MG
50 CAPSULE ORAL DAILY
Qty: 30 CAPSULE | Refills: 3 | COMMUNITY
Start: 2021-12-27

## 2021-12-26 RX ORDER — DEXAMETHASONE 6 MG/1
6 TABLET ORAL
Qty: 5 TABLET | Refills: 0 | Status: SHIPPED | OUTPATIENT
Start: 2021-12-26 | End: 2021-12-31

## 2021-12-26 RX ADMIN — ONDANSETRON 4 MG: 4 TABLET, ORALLY DISINTEGRATING ORAL at 10:36

## 2021-12-26 RX ADMIN — ENOXAPARIN SODIUM 30 MG: 100 INJECTION SUBCUTANEOUS at 10:31

## 2021-12-26 RX ADMIN — BARICITINIB 4 MG: 2 TABLET, FILM COATED ORAL at 10:31

## 2021-12-26 RX ADMIN — GUAIFENESIN 600 MG: 600 TABLET, EXTENDED RELEASE ORAL at 10:30

## 2021-12-26 RX ADMIN — Medication 2 PUFF: at 08:18

## 2021-12-26 RX ADMIN — METHYLPREDNISOLONE SODIUM SUCCINATE 40 MG: 40 INJECTION, POWDER, FOR SOLUTION INTRAMUSCULAR; INTRAVENOUS at 10:31

## 2021-12-26 RX ADMIN — ZINC SULFATE 220 MG (50 MG) CAPSULE 50 MG: CAPSULE at 10:31

## 2021-12-26 RX ADMIN — Medication 1000 UNITS: at 10:31

## 2021-12-26 RX ADMIN — SODIUM CHLORIDE, PRESERVATIVE FREE 10 ML: 5 INJECTION INTRAVENOUS at 10:32

## 2021-12-26 ASSESSMENT — PAIN SCALES - GENERAL: PAINLEVEL_OUTOF10: 0

## 2021-12-26 NOTE — PROGRESS NOTES
DENISE    Pt has been improving greatly over last couple days. Has been on room air since yesterday. Pt would like to discuss discharge and possible go home today.     REPLY-Could you please do walking pulse ox

## 2021-12-26 NOTE — PROGRESS NOTES
Ps- K. Aracely Braxton    Blood test just resulted from 12/21 for patient it is positive    Reply-likely contamination. No need to worry about it.

## 2021-12-26 NOTE — PROGRESS NOTES
O2 Walk Test    O2 saturation at REST on ROOM AIR = 92%   If saturation is 89% or above please proceed with steps 2 and 3.      O2 saturation with AMBULATION of 20 feet on ROOM AIR = 90 %       INP notified: ______     Signature: _________________________ Date: __________   Time: ______

## 2021-12-27 ENCOUNTER — CARE COORDINATION (OUTPATIENT)
Dept: CASE MANAGEMENT | Age: 41
End: 2021-12-27

## 2021-12-27 NOTE — CARE COORDINATION
[facility] Transitions Initial Follow Up Call    Call within 2 business days of discharge: Yes    Patient: Kalia Cartagena Patient : 1980   MRN: <[id number]>  Reason for Admission: PNA d/t COVID+  Discharge Date: 21 RARS: Readmission Risk Score: 4.6 ( )      Last Discharge [facility]       Complaint Diagnosis Description Type Department Provider    21 Positive For Covid-19; Shortness of Breath Acute respiratory failure with hypoxia ([facility] [address] . .. ED to Hosp-Admission (Discharged) (ADMITTED) Lauren Lawson C5 Romana Emmer, MD; Ashley Hoffman,... Spoke with: DRISS    Facility: [facility]    Attempted to reach patient via phone for initial post hospital transition call. VM left stating purpose of call along with my contact information requesting a return call. Kaden Noble  [facility]  Care Transitions  [phone]    Care Transitions 24 Hour Call    Care Transitions Interventions         Follow Up  No future appointments.     Kaden Noble LPN

## 2021-12-28 ENCOUNTER — CARE COORDINATION (OUTPATIENT)
Dept: CASE MANAGEMENT | Age: 41
End: 2021-12-28

## 2021-12-28 NOTE — CARE COORDINATION
Lin [facility] Transitions Initial Follow Up Call    Call within 2 business days of discharge: No    Patient: Salazar Good Patient : 1980   MRN: <[id number]>  Reason for Admission: [address]  Discharge Date: 21 RARS: Readmission Risk Score: 4.6 ( )      Last Discharge [facility]       Complaint Diagnosis Description Type Department Provider    21 Positive For Covid-19; Shortness of Breath Acute respiratory failure with hypoxia ([facility] [address] . .. ED to Hosp-Admission (Discharged) (ADMITTED) [address] [address] Saran Ortiz MD; Radu Cervantes,... Spoke with: Cleveland Benitez    Non-face-to-face services provided:  Obtained and reviewed discharge summary and/or continuity of care documents  Education of patient/family/caregiver/guardian to support self-management-            Care Transitions 24 Hour Call    Care Transitions Interventions       Patient contacted regarding COVID-19 diagnosis. Discussed COVID-19 related testing which was available at this time. Test results were positive. Patient informed of results, if available? Yes. Provided introduction to self, and explanation of the CTN/ACM role, and reason for call due to risk factors for infection and/or exposure to COVID-19. Symptoms reviewed with family who verbalized the following symptoms: no new symptoms and no worsening symptoms. Due to no new or worsening symptoms encounter was not routed to provider for escalation. Discussed follow-up appointments. If no appointment was previously scheduled, appointment scheduling offered: Yes. [facility] Zachary Cisneros follow up appointment(s): No future appointments. Non-[facility] follow up appointment(s):     Educated patient about risk for severe COVID-19 due to risk factors according to CDC guidelines. CTN reviewed discharge instructions, medical action plan and red flag symptoms with the patient who verbalized understanding. Discussed COVID vaccination status: No. Education provided on COVID-19 vaccination as appropriate. Discussed exposure protocols and quarantine with CDC Guidelines. Patient was given an opportunity to verbalize any questions and concerns and agrees to contact CTN or health care provider for questions related to their healthcare. Reviewed and educated patient on any new and changed medications related to discharge diagnosis     Was patient discharged with a pulse oximeter? No Discussed and confirmed pulse oximeter discharge instructions and when to notify provider or seek emergency care. CTN provided contact information. Plan for follow-up call in 5-7 days based on severity of symptoms and risk factors. States she's doing well. Reports ongoing SOB with exertion. Denies fever, CP, cough or any other ongoing symptoms or concerns. Meds reviewed and she reports taking them as prescribed. Denies questions or concerns at this time. Follow Up  No future appointments.     Petrona Zambrano

## 2021-12-29 NOTE — DISCHARGE SUMMARY
Physician Discharge Summary     Patient ID:  Nancy Martines  [id number]  85 y.o.  1980    Admit date: 12/21/2021    Discharge date and time: 12/26/2021  3:39 PM     Admitting Physician: Daya Smith MD     Discharge Physician: El Butler MD    Admission Diagnoses: Acute respiratory failure ([address] [J96.00]  Acute respiratory failure with hypoxia ([address] [J96.01]  COVID-19 virus infection [U07.1]  Acute respiratory failure due to COVID-19 ([address] [U07.1, J96.00]    Discharge Diagnoses: Acute hypoxic resp failure 2/2 COVID PNA    Admission Condition: fair    Discharged Condition: good    Indication for Admission: Worsening sob    Hospital Course:   39 y.o. female presented to [facility] with worsening SOB. Pt noted fever on 12/13 and noted temps of 101.6 to 103. She did a home covid test on 12/13 and was positive. Admitted for acute resp failure due to covid-19. CTPA neg for PE. Pt is vaccinated, not yet boostered. IV steroids started on 12/21. Continued on zinc/vit C ordered. Received baricitinib for 3 days. Weaned off to room air. Discharged home in stable condition.      Consults: none    Discharge Exam:  /72   Pulse 66   Temp 97.6 °F (36.4 °C) (Oral)   Resp 18   Ht 6' (1.829 m)   Wt 260 lb (117.9 kg)   LMP 03/29/2016   SpO2 92%   BMI 35.26 kg/m²     General Appearance:    Alert, cooperative, no distress, appears stated age   Head:    Normocephalic, without obvious abnormality, atraumatic   Eyes:    PERRL, conjunctiva/corneas clear, EOM's intact, fundi     benign, both eyes   Ears:    Normal TM's and external ear canals, both ears   Nose:   Nares normal, septum midline, mucosa normal, no drainage    or sinus tenderness   Throat:   Lips, mucosa, and tongue normal; teeth and gums normal   Neck:   Supple, symmetrical, trachea midline, no adenopathy;     thyroid:  no enlargement/tenderness/nodules; no carotid    bruit or JVD   Back:     Symmetric, no curvature, ROM normal, no CVA tenderness Lungs:     Clear to auscultation bilaterally, respirations unlabored   Chest Wall:    No tenderness or deformity    Heart:    Regular rate and rhythm, S1 and S2 normal, no murmur, rub   or gallop   Breast Exam:    No tenderness, masses, or nipple abnormality   Abdomen:     Soft, non-tender, bowel sounds active all four quadrants,     no masses, no organomegaly   Genitalia:    Normal female without lesion, discharge or tenderness   Rectal:    Normal tone ;guaiac negative stool   Extremities:   Extremities normal, atraumatic, no cyanosis or edema   Pulses:   2+ and symmetric all extremities   Skin:   Skin color, texture, turgor normal, no rashes or lesions   Lymph nodes:   Cervical, supraclavicular, and axillary nodes normal   Neurologic:   CNII-XII intact, normal strength, sensation and reflexes     throughout       Disposition: home    In process/preliminary results:  Outstanding Order Results     Date and Time Order Name Status Description    12/21/2021  5:36 AM Culture, Blood 1 Preliminary           Patient Instructions:   Discharge Medication List as of 12/26/2021  3:10 PM      START taking these medications    Details   zinc sulfate (ZINCATE) 220 (50 Zn) MG capsule Take 1 capsule by mouth daily, Disp-30 capsule, R-3OTC      ascorbic acid (VITAMIN C) 500 MG tablet Take 1 tablet by mouth every evening, Disp-30 tablet, R-3Normal      Vitamin D (CHOLECALCIFEROL) 25 MCG (1000 UT) TABS tablet Take 1 tablet by mouth daily, Disp-60 tablet, R-3Labeling may look different. 25 mcg=1000 Units. Please double check dosages. Normal      dexamethasone (DECADRON) 6 MG tablet Take 1 tablet by mouth daily (with breakfast) for 5 days, Disp-5 tablet, R-0Normal         CONTINUE these medications which have NOT CHANGED    Details   ondansetron (ZOFRAN) 4 MG tablet Take 1 tablet by mouth 3 times daily as needed for Nausea or Vomiting, Disp-15 tablet, R-0Normal      pseudoephedrine (DECONGESTANT) 30 MG tablet Take 1 tablet by mouth every 6 hours as needed for Congestion, Disp-20 tablet, R-1Normal           Activity: activity as tolerated  Diet: regular diet  Wound Care: none needed    Follow-up with pcp in 2 weeks.     Signed:  Lewis Guaman MD  Time spent > 35 mins  12/29/2021  6:43 PM

## 2021-12-30 LAB
BLOOD CULTURE, ROUTINE: ABNORMAL
ORGANISM: ABNORMAL

## 2021-12-31 NOTE — PROGRESS NOTES
Physician Progress Note      PATIENT:               Mel Phelan  CSN #:                  [id number]  :                       1980  ADMIT DATE:       2021 5:21 AM  DISCH DATE:        2021 3:39 PM  RESPONDING  PROVIDER #:        Manas Alexander MD          QUERY TEXT:    Patient admitted with COVID 19, noted to have \"interval progression of   multifocal pneumonia \" per CXR on . If possible, please document in   progress notes and discharge summary if you are evaluating and/or treating any   of the following: The medical record reflects the following:  Risk Factors: COVID 19  Clinical Indicators: \"interval progression of multifocal pneumonia \" per CXR,   \"multifocal pneumonia, to include atypical viral causes\" per CT  Treatment: serial labs, IV Steroids, considering  tocilizumab and baricitinib,   supplemental oxygen, supportive care    Thank you,  Garry Santiago RN, CDS  [phone]  Options provided:  -- Pneumonia  -- Other - I will add my own diagnosis  -- Disagree - Not applicable / Not valid  -- Disagree - Clinically unable to determine / Unknown  -- Refer to Clinical Documentation Reviewer    PROVIDER RESPONSE TEXT:    Provider is clinically unable to determine a response to this query.     Query created by: Fantasma Martinez on 2021 12:01 PM      Electronically signed by:  Manas Alexander MD 2021 5:33 PM

## 2022-01-04 ENCOUNTER — CARE COORDINATION (OUTPATIENT)
Dept: CASE MANAGEMENT | Age: 42
End: 2022-01-04

## 2022-01-04 NOTE — CARE COORDINATION
Lin [facility] Transitions Follow Up Call    2022    Patient: Nayla Figueredo  Patient : 1980   MRN: [id number]  Reason for Admission: covid  Discharge Date: 21 RARS: Readmission Risk Score: 4.6 ( )       Attempted to reach patient via phone for care transition. VM left stating purpose of call along with my contact information requesting a return call. Care Transitions Subsequent and Final Call    Subsequent and Final Calls  Care Transitions Interventions  Other Interventions: Follow Up  No future appointments.     Renetta HENDRIXN, RN, [facility]  Care Transition Nurse  [phone] mobile

## 2022-01-11 ENCOUNTER — CARE COORDINATION (OUTPATIENT)
Dept: CASE MANAGEMENT | Age: 42
End: 2022-01-11

## 2022-01-11 NOTE — CARE COORDINATION
Lin [facility] Transitions Follow Up Call    2022    Patient: Lolita Ponce  Patient : 1980   MRN: [id number]  Reason for Admission: Covid PNA   Discharge Date: 21 RARS: Readmission Risk Score: 4.6 ( )         Spoke with: Lolita Ponce    Spoke with patient who reported she is doing alright and feels things are progressing in the right direction. Patient reported at times she feels better than others. Patient reported her breathing is stable and she denied any worsening symptoms at this time. Patient reported she doesn't have a PCP and CTN provided patient with \"Find a Physician\" line to obtain a new provider. CTN also discussed [facility] with patient. CTN advised patient of use of urgent care or physicians 24 hr access line if assistance is needed after hours. Care Transitions Subsequent and Final Call    Subsequent and Final Calls  Care Transitions Interventions  Other Interventions: Follow Up  No future appointments.     Cem CALLAWAY, RN, [facility]  Care Transition Nurse  [phone] mobile

## 2022-01-18 ENCOUNTER — CARE COORDINATION (OUTPATIENT)
Dept: CASE MANAGEMENT | Age: 42
End: 2022-01-18

## 2022-01-18 NOTE — CARE COORDINATION
Care Transitions Outreach Attempt    Call within 2 business days of discharge: Yes   Attempted to reach patient for transitions of care follow up. Unable to reach patient. Left HIPPA Compliant . Tarsha Pandey LPN, [facility]: [phone]    Patient: Yaritza Flores Patient : 1980 MRN: [id number]    Last Discharge [facility]       Complaint Diagnosis Description Type Department Provider    21 Positive For Covid-19; Shortness of Breath Acute respiratory failure with hypoxia ([address] . .. ED to Hosp-Admission (Discharged) (ADMITTED) Adwoa Méndez MD; Sudhir Villa,... Was this an external facility discharge? No Discharge Facility:     Noted following upcoming appointments from discharge chart review:   [facility] follow up appointment(s): No future appointments.   Non-[facility] follow up appointment(s):

## 2022-01-21 ENCOUNTER — CARE COORDINATION (OUTPATIENT)
Dept: CASE MANAGEMENT | Age: 42
End: 2022-01-21